# Patient Record
Sex: MALE | Race: BLACK OR AFRICAN AMERICAN | NOT HISPANIC OR LATINO | URBAN - METROPOLITAN AREA
[De-identification: names, ages, dates, MRNs, and addresses within clinical notes are randomized per-mention and may not be internally consistent; named-entity substitution may affect disease eponyms.]

---

## 2017-02-27 ENCOUNTER — INPATIENT (INPATIENT)
Facility: HOSPITAL | Age: 52
LOS: 2 days | Discharge: HOME | End: 2017-03-02
Attending: INTERNAL MEDICINE | Admitting: INTERNAL MEDICINE
Payer: COMMERCIAL

## 2017-02-27 VITALS
RESPIRATION RATE: 18 BRPM | TEMPERATURE: 103 F | SYSTOLIC BLOOD PRESSURE: 173 MMHG | WEIGHT: 250 LBS | HEIGHT: 71 IN | HEART RATE: 100 BPM | OXYGEN SATURATION: 97 % | DIASTOLIC BLOOD PRESSURE: 81 MMHG

## 2017-02-27 DIAGNOSIS — N40.0 BENIGN PROSTATIC HYPERPLASIA WITHOUT LOWER URINARY TRACT SYMPTOMS: ICD-10-CM

## 2017-02-27 DIAGNOSIS — R50.9 FEVER, UNSPECIFIED: ICD-10-CM

## 2017-02-27 DIAGNOSIS — N41.0 ACUTE PROSTATITIS: ICD-10-CM

## 2017-02-27 DIAGNOSIS — R30.0 DYSURIA: ICD-10-CM

## 2017-02-27 DIAGNOSIS — K80.20 CALCULUS OF GALLBLADDER WITHOUT CHOLECYSTITIS WITHOUT OBSTRUCTION: ICD-10-CM

## 2017-02-27 DIAGNOSIS — R59.0 LOCALIZED ENLARGED LYMPH NODES: ICD-10-CM

## 2017-02-27 DIAGNOSIS — R35.0 FREQUENCY OF MICTURITION: ICD-10-CM

## 2017-02-27 DIAGNOSIS — N17.9 ACUTE KIDNEY FAILURE, UNSPECIFIED: ICD-10-CM

## 2017-02-27 DIAGNOSIS — K57.30 DIVERTICULOSIS OF LARGE INTESTINE WITHOUT PERFORATION OR ABSCESS WITHOUT BLEEDING: ICD-10-CM

## 2017-02-27 DIAGNOSIS — K42.9 UMBILICAL HERNIA WITHOUT OBSTRUCTION OR GANGRENE: ICD-10-CM

## 2017-02-27 LAB
ALBUMIN SERPL ELPH-MCNC: 3.3 G/DL — SIGNIFICANT CHANGE UP (ref 3.3–5)
ALP SERPL-CCNC: 104 U/L — SIGNIFICANT CHANGE UP (ref 40–120)
ALT FLD-CCNC: 36 U/L — SIGNIFICANT CHANGE UP (ref 12–78)
ANION GAP SERPL CALC-SCNC: 10 MMOL/L — SIGNIFICANT CHANGE UP (ref 5–17)
ANISOCYTOSIS BLD QL: SLIGHT — SIGNIFICANT CHANGE UP
APPEARANCE UR: CLEAR — SIGNIFICANT CHANGE UP
AST SERPL-CCNC: 26 U/L — SIGNIFICANT CHANGE UP (ref 15–37)
BACTERIA # UR AUTO: ABNORMAL
BILIRUB SERPL-MCNC: 2.7 MG/DL — HIGH (ref 0.2–1.2)
BILIRUB UR-MCNC: NEGATIVE — SIGNIFICANT CHANGE UP
BUN SERPL-MCNC: 9 MG/DL — SIGNIFICANT CHANGE UP (ref 7–23)
CALCIUM SERPL-MCNC: 8.5 MG/DL — SIGNIFICANT CHANGE UP (ref 8.5–10.1)
CHLORIDE SERPL-SCNC: 104 MMOL/L — SIGNIFICANT CHANGE UP (ref 96–108)
CO2 SERPL-SCNC: 24 MMOL/L — SIGNIFICANT CHANGE UP (ref 22–31)
COLOR SPEC: YELLOW — SIGNIFICANT CHANGE UP
CREAT SERPL-MCNC: 1.54 MG/DL — HIGH (ref 0.5–1.3)
DIFF PNL FLD: ABNORMAL
EPI CELLS # UR: SIGNIFICANT CHANGE UP
FLUAV SPEC QL CULT: NEGATIVE — SIGNIFICANT CHANGE UP
FLUBV AG SPEC QL IA: NEGATIVE — SIGNIFICANT CHANGE UP
GLUCOSE SERPL-MCNC: 137 MG/DL — HIGH (ref 70–99)
GLUCOSE UR QL: NEGATIVE MG/DL — SIGNIFICANT CHANGE UP
HCT VFR BLD CALC: 37 % — LOW (ref 39–50)
HCV AB S/CO SERPL IA: 0.09 S/CO — SIGNIFICANT CHANGE UP
HCV AB SERPL-IMP: SIGNIFICANT CHANGE UP
HGB BLD-MCNC: 12.8 G/DL — LOW (ref 13–17)
HYPOCHROMIA BLD QL: SLIGHT — SIGNIFICANT CHANGE UP
KETONES UR-MCNC: NEGATIVE — SIGNIFICANT CHANGE UP
LACTATE SERPL-SCNC: 1 MMOL/L — SIGNIFICANT CHANGE UP (ref 0.7–2)
LEUKOCYTE ESTERASE UR-ACNC: ABNORMAL
LYMPHOCYTES # BLD AUTO: 13 % — SIGNIFICANT CHANGE UP (ref 13–44)
MACROCYTES BLD QL: SLIGHT — SIGNIFICANT CHANGE UP
MCHC RBC-ENTMCNC: 33.8 PG — SIGNIFICANT CHANGE UP (ref 27–34)
MCHC RBC-ENTMCNC: 34.6 GM/DL — SIGNIFICANT CHANGE UP (ref 32–36)
MCV RBC AUTO: 97.7 FL — SIGNIFICANT CHANGE UP (ref 80–100)
MICROCYTES BLD QL: SLIGHT — SIGNIFICANT CHANGE UP
MONOCYTES NFR BLD AUTO: 5 % — SIGNIFICANT CHANGE UP (ref 2–14)
NEUTROPHILS NFR BLD AUTO: 77 % — SIGNIFICANT CHANGE UP (ref 43–77)
NEUTS BAND # BLD: 5 % — SIGNIFICANT CHANGE UP (ref 0–8)
NITRITE UR-MCNC: NEGATIVE — SIGNIFICANT CHANGE UP
PH UR: 6.5 — SIGNIFICANT CHANGE UP (ref 4.8–8)
PLAT MORPH BLD: NORMAL — SIGNIFICANT CHANGE UP
PLATELET # BLD AUTO: 154 K/UL — SIGNIFICANT CHANGE UP (ref 150–400)
POTASSIUM SERPL-MCNC: 3.3 MMOL/L — LOW (ref 3.5–5.3)
POTASSIUM SERPL-SCNC: 3.3 MMOL/L — LOW (ref 3.5–5.3)
PROT SERPL-MCNC: 8.5 GM/DL — HIGH (ref 6–8.3)
PROT UR-MCNC: 100 MG/DL
RBC # BLD: 3.78 M/UL — LOW (ref 4.2–5.8)
RBC # FLD: 11.3 % — SIGNIFICANT CHANGE UP (ref 11–15)
RBC BLD AUTO: ABNORMAL
RBC CASTS # UR COMP ASSIST: SIGNIFICANT CHANGE UP /HPF (ref 0–4)
SODIUM SERPL-SCNC: 138 MMOL/L — SIGNIFICANT CHANGE UP (ref 135–145)
SP GR SPEC: 1 — LOW (ref 1.01–1.02)
UROBILINOGEN FLD QL: 12 MG/DL
WBC # BLD: 25.2 K/UL — HIGH (ref 3.8–10.5)
WBC # FLD AUTO: 25.2 K/UL — HIGH (ref 3.8–10.5)
WBC UR QL: ABNORMAL

## 2017-02-27 PROCEDURE — 71010: CPT | Mod: 26

## 2017-02-27 PROCEDURE — 74176 CT ABD & PELVIS W/O CONTRAST: CPT | Mod: 26

## 2017-02-27 PROCEDURE — 99285 EMERGENCY DEPT VISIT HI MDM: CPT | Mod: 25

## 2017-02-27 PROCEDURE — 99053 MED SERV 10PM-8AM 24 HR FAC: CPT

## 2017-02-27 PROCEDURE — 99223 1ST HOSP IP/OBS HIGH 75: CPT

## 2017-02-27 RX ORDER — CEFTRIAXONE 500 MG/1
1 INJECTION, POWDER, FOR SOLUTION INTRAMUSCULAR; INTRAVENOUS EVERY 24 HOURS
Refills: 0 | Status: DISCONTINUED | OUTPATIENT
Start: 2017-02-28 | End: 2017-03-01

## 2017-02-27 RX ORDER — AMLODIPINE BESYLATE 2.5 MG/1
10 TABLET ORAL DAILY
Refills: 0 | Status: DISCONTINUED | OUTPATIENT
Start: 2017-02-27 | End: 2017-03-02

## 2017-02-27 RX ORDER — IBUPROFEN 200 MG
600 TABLET ORAL ONCE
Refills: 0 | Status: COMPLETED | OUTPATIENT
Start: 2017-02-27 | End: 2017-02-27

## 2017-02-27 RX ORDER — SODIUM CHLORIDE 9 MG/ML
2000 INJECTION INTRAMUSCULAR; INTRAVENOUS; SUBCUTANEOUS ONCE
Refills: 0 | Status: COMPLETED | OUTPATIENT
Start: 2017-02-27 | End: 2017-02-27

## 2017-02-27 RX ORDER — ACETAMINOPHEN 500 MG
650 TABLET ORAL EVERY 6 HOURS
Refills: 0 | Status: DISCONTINUED | OUTPATIENT
Start: 2017-02-27 | End: 2017-03-02

## 2017-02-27 RX ORDER — ACETAMINOPHEN 500 MG
650 TABLET ORAL ONCE
Refills: 0 | Status: COMPLETED | OUTPATIENT
Start: 2017-02-27 | End: 2017-02-27

## 2017-02-27 RX ORDER — TAMSULOSIN HYDROCHLORIDE 0.4 MG/1
0.4 CAPSULE ORAL AT BEDTIME
Refills: 0 | Status: DISCONTINUED | OUTPATIENT
Start: 2017-02-27 | End: 2017-03-02

## 2017-02-27 RX ORDER — SODIUM CHLORIDE 9 MG/ML
3 INJECTION INTRAMUSCULAR; INTRAVENOUS; SUBCUTANEOUS ONCE
Refills: 0 | Status: COMPLETED | OUTPATIENT
Start: 2017-02-27 | End: 2017-02-27

## 2017-02-27 RX ORDER — PIPERACILLIN AND TAZOBACTAM 4; .5 G/20ML; G/20ML
3.38 INJECTION, POWDER, LYOPHILIZED, FOR SOLUTION INTRAVENOUS ONCE
Refills: 0 | Status: COMPLETED | OUTPATIENT
Start: 2017-02-27 | End: 2017-02-27

## 2017-02-27 RX ORDER — VANCOMYCIN HCL 1 G
1000 VIAL (EA) INTRAVENOUS ONCE
Refills: 0 | Status: COMPLETED | OUTPATIENT
Start: 2017-02-27 | End: 2017-02-27

## 2017-02-27 RX ORDER — CEFTRIAXONE 500 MG/1
INJECTION, POWDER, FOR SOLUTION INTRAMUSCULAR; INTRAVENOUS
Refills: 0 | Status: DISCONTINUED | OUTPATIENT
Start: 2017-02-27 | End: 2017-03-01

## 2017-02-27 RX ORDER — ENOXAPARIN SODIUM 100 MG/ML
40 INJECTION SUBCUTANEOUS DAILY
Refills: 0 | Status: DISCONTINUED | OUTPATIENT
Start: 2017-02-27 | End: 2017-02-28

## 2017-02-27 RX ORDER — CEFTRIAXONE 500 MG/1
1 INJECTION, POWDER, FOR SOLUTION INTRAMUSCULAR; INTRAVENOUS ONCE
Refills: 0 | Status: COMPLETED | OUTPATIENT
Start: 2017-02-27 | End: 2017-02-27

## 2017-02-27 RX ORDER — SODIUM CHLORIDE 9 MG/ML
1000 INJECTION INTRAMUSCULAR; INTRAVENOUS; SUBCUTANEOUS
Refills: 0 | Status: DISCONTINUED | OUTPATIENT
Start: 2017-02-27 | End: 2017-02-27

## 2017-02-27 RX ORDER — POTASSIUM CHLORIDE 20 MEQ
40 PACKET (EA) ORAL ONCE
Refills: 0 | Status: COMPLETED | OUTPATIENT
Start: 2017-02-27 | End: 2017-02-27

## 2017-02-27 RX ORDER — SODIUM CHLORIDE 9 MG/ML
1000 INJECTION INTRAMUSCULAR; INTRAVENOUS; SUBCUTANEOUS
Refills: 0 | Status: DISCONTINUED | OUTPATIENT
Start: 2017-02-27 | End: 2017-03-01

## 2017-02-27 RX ADMIN — Medication 650 MILLIGRAM(S): at 09:12

## 2017-02-27 RX ADMIN — SODIUM CHLORIDE 50 MILLILITER(S): 9 INJECTION INTRAMUSCULAR; INTRAVENOUS; SUBCUTANEOUS at 16:42

## 2017-02-27 RX ADMIN — CEFTRIAXONE 100 GRAM(S): 500 INJECTION, POWDER, FOR SOLUTION INTRAMUSCULAR; INTRAVENOUS at 10:03

## 2017-02-27 RX ADMIN — PIPERACILLIN AND TAZOBACTAM 200 GRAM(S): 4; .5 INJECTION, POWDER, LYOPHILIZED, FOR SOLUTION INTRAVENOUS at 08:49

## 2017-02-27 RX ADMIN — SODIUM CHLORIDE 3 MILLILITER(S): 9 INJECTION INTRAMUSCULAR; INTRAVENOUS; SUBCUTANEOUS at 05:59

## 2017-02-27 RX ADMIN — Medication 600 MILLIGRAM(S): at 06:34

## 2017-02-27 RX ADMIN — ENOXAPARIN SODIUM 40 MILLIGRAM(S): 100 INJECTION SUBCUTANEOUS at 16:44

## 2017-02-27 RX ADMIN — Medication 650 MILLIGRAM(S): at 16:42

## 2017-02-27 RX ADMIN — Medication 650 MILLIGRAM(S): at 22:27

## 2017-02-27 RX ADMIN — TAMSULOSIN HYDROCHLORIDE 0.4 MILLIGRAM(S): 0.4 CAPSULE ORAL at 22:27

## 2017-02-27 RX ADMIN — SODIUM CHLORIDE 1000 MILLILITER(S): 9 INJECTION INTRAMUSCULAR; INTRAVENOUS; SUBCUTANEOUS at 06:07

## 2017-02-27 RX ADMIN — Medication 40 MILLIEQUIVALENT(S): at 16:43

## 2017-02-27 RX ADMIN — Medication 600 MILLIGRAM(S): at 07:37

## 2017-02-27 RX ADMIN — SODIUM CHLORIDE 125 MILLILITER(S): 9 INJECTION INTRAMUSCULAR; INTRAVENOUS; SUBCUTANEOUS at 07:40

## 2017-02-27 RX ADMIN — AMLODIPINE BESYLATE 10 MILLIGRAM(S): 2.5 TABLET ORAL at 16:43

## 2017-02-27 NOTE — ED PROVIDER NOTE - OBJECTIVE STATEMENT
51 year old male with h/o HTN presents today c/o fever and difficulty urinating since Friday, pt c/o urinating in dribbles especially during the night, +cough + back pains +weakness (-) chest pain (-) sob (-) palpitations (-) sore throat (-) ear pain (-) nausea or vomiting +dry mouth, normal appetite, pt has been taking tylenol which he last took last night

## 2017-02-27 NOTE — CONSULT NOTE ADULT - ASSESSMENT
51 yrs old male has dysuria abd fever with no hydro on CT scan, most likely acute prostatitis. suggest : continue Rocephin 51 yrs old male has dysuria abd fever with no hydro on CT scan, most likely acute prostatitis. suggest : continue Rocephin  PVR; 58 mls will add Flomax

## 2017-02-27 NOTE — CONSULT NOTE ADULT - SUBJECTIVE AND OBJECTIVE BOX
Surgeon:  Cedric Arce MD,DO.    [phone]    Consult requesting by:  Leonardo Bowers MD    HISTORY OF PRESENT ILLNESS:  51y Male patient with PMHx of HTN, came to Emergency room with complaints of difficult urinating, dribbles, and high fever since last Friday, also complaints of  weakness, mild cough.      PAST MEDICAL & SURGICAL HISTORY:  Hypertension.  Genital Herpes    No significant past surgical history      MEDICATIONS  (STANDING):  cefTRIAXone   IVPB  IV Intermittent   enoxaparin Injectable 40milliGRAM(s) SubCutaneous daily  amLODIPine   Tablet 10milliGRAM(s) Oral daily  potassium chloride    Tablet ER 40milliEquivalent(s) Oral once  sodium chloride 0.9%. 1000milliLiter(s) IV Continuous <Continuous>    MEDICATIONS  (PRN):  acetaminophen   Tablet 650milliGRAM(s) Oral every 6 hours PRN For Temp greater than 38 C (100.4 F)  Valtrex PRN  Testosterone PRN, prescribed by PMD    Allergies:  No Known Allergies    SOCIAL HISTORY:  Smoker: [ ] Yes  [x ] No        PACK YEARS:                           ETOH use: [ ] Yes  [x ] No              FREQUENCY / QUANTITY:  elicit Drug use:  [ ] Yes  [x ] No   Recovered from Drug abuse. Stopped > 10 years ago  Occupation: Building service.    FAMILY HISTORY:  No pertinent family history in first degree relatives    REVIEW OF SYSTEMS:  CONSTITUTIONAL: + fever,  no weight loss, + fatigue  EYES: No eye pain, visual disturbances, or discharge  ENT:  No difficulty hearing, tinnitus, vertigo; No sinus or throat pain  NECK: No pain or stiffness  BREASTS: No pain, masses, or nipple discharge  RESPIRATORY: Mild  cough, no wheezing, + chills, no  hemoptysis; No shortness of breath  CARDIOVASCULAR: No chest pain, palpitations, dizziness, or leg swelling  GASTROINTESTINAL: No abdominal or epigastric pain. No nausea, vomiting, or hematemesis; No diarrhea or constipation. No melena or hematochezia.  GENITOURINARY: +  dysuria, + frequency, no noticed hematuria, no  incontinence,  NEUROLOGICAL: No headaches, memory loss, loss of strength, numbness, or tremors  SKIN: No itching, burning, rashes, or lesions   LYMPH NODES: No enlarged glands  ENDOCRINE: No heat or cold intolerance; No hair loss  MUSCULOSKELETAL: No joint pain or swelling; No muscle, back, or extremity pain  PSYCHIATRIC: No depression, anxiety, mood swings, or difficulty sleeping  HEME/LYMPH: No easy bruising, or bleeding gums  ALLERGY AND IMMUNOLOGIC: No hives or eczema    PHYSICAL EXAM  Vital Signs Last 24 Hrs  T(C): 39.2, Max: 39.6 ( @ 04:43)  T(F): 102.6, Max: 103.2 ( @ 04:43)  HR: 91 (65 - 100)  BP: 169/95 (110/46 - 173/81)  BP(mean): --  RR: 18 (16 - 18)  SpO2: 98% (96% - 99%)  I&O's Summary    General: Patient alert. Oriented, on NAD, well developed.  HEENT: Normo cephalus, Conjunctiva  anicteric, No JVD, Neck supple.  LUNGS: CTA B/L,  no  Rhonchi, no rales ,no wheezing  HEART: S1 S2 RRR, normal PMI  ABDOMEN: Non distended, + normal BS, + Soft, Non tenderness, small Umbilical hernia, no incarceration.  RECTAL:  Patient refused.  EXTREMITIES: No edema, no calf tenderness.  NEURO: AxAxOx 3     LABS:                        12.8   25.2  )-----------( 154      ( 2017 06:15 )             37.0     2017 06:15    138    |  104    |  9      ----------------------------<  137    3.3     |  24     |  1.54     Ca    8.5        2017 06:15    TPro  8.5    /  Alb  3.3    /  T.Bili 2.7   /  DBili  x      /  AST  26     /  ALT  36     /  AlkPhos  104    2017 06:15      Urinalysis Basic - ( 2017 06:17 )    Color: Yellow / Appearance: Clear / S.005 / pH: x  Gluc : Negative  / Bili: Negative / Urobili: 12 mg/dL   Blood: +/ Protein: 100 mg/dL / Nitrite: Negative   Leuk Esterase: Small / RBC: 0-2 /HPF / WBC 6-10   Sq Epi: x / Non Sq Epi: Few / Bacteria: Moderate      LIVER FUNCTIONS - ( 2017 06:15 )  Alb: 3.3 g/dL / Pro: 8.5 gm/dL / ALK PHOS: 104 U/L / ALT: 36 U/L / AST: 26 U/L / GGT: x             RADIOLOGY:  XAM:  CT ABDOMEN AND PELVIS                          PROCEDURE DATE:  2017      INTERPRETATION:  CLINICAL INFORMATION: Difficulty urinating    COMPARISON: No prior examinations are available for comparison.    PROCEDURE:    Serial axial sections through the abdomen and pelvis were obtained with   the patient in supine position without the use of intravenous contrast.    Coronal and sagittal 3-D reformats were created from the axial images.    FINDINGS:    Evaluation of solid organs and vascular structures is limited by lack of   intravenous contrast, which is standard for urinary calculus assessment   CT.     LOWER CHEST: Clear lung bases.    ABDOMEN:  RIGHT KIDNEY AND URETER: No calculi. No hydronephrosis.  LEFT KIDNEY AND URETER: No calculi. No hydronephrosis.  LIVER: Within normal limits.  BILE DUCTS: Normal caliber.  GALLBLADDER: Question tiny calcified gallstones. Normal caliber wall.  PANCREAS: Within normal limits.  SPLEEN: Within normal limits.  ADRENALS: Within normal limits.    PELVIS:  REPRODUCTIVE ORGANS: Enlarged prostate gland measuring 6.1 x 5 x 5.4 cm.   Periprostatic inflammatory changes.  URINARY BLADDER: Mild urinary bladder wall thickening.    BOWEL: Several colonic diverticula. No bowel obstruction.  APPENDIX: Within normal limits.  PERITONEUM: No ascites or free air. No fluid collection.  VESSELS: Within normal limits.  RETROPERITONEUM: Mild retroperitoneal and pelvic adenopathy measuring up   to 17 mm short axis dimension.  ABDOMINAL WALL: Smallfat-containing umbilical hernia.  BONES: Hip and spine degenerative changes.    IMPRESSION:    No urolithiasis or hydronephrosis.  Mild retroperitoneal and pelvic adenopathy.  Enlarged prostate gland with periprostatic inflammatory changes,   suspicious for prostatitis.      DERIAN BERRIOS M.D., ATTENDING RADIOLOGIST  This document has been electronically signed. 2017  8:39AM                HEALTH ISSUES - PROBLEM Dx:

## 2017-02-27 NOTE — H&P ADULT. - HISTORY OF PRESENT ILLNESS
ED provider stated: "51 year old male with h/o HTN presents today c/o fever and difficulty urinating since Friday, pt c/o urinating in dribbles especially during the night, +cough + back pains +weakness (-) chest pain (-) sob (-) palpitations (-) sore throat (-) ear pain (-) nausea or vomiting +dry mouth, normal appetite, pt has been taking tylenol which he last took last night"

## 2017-02-27 NOTE — ED PROVIDER NOTE - CARE PLAN
Principal Discharge DX:	Febrile illness Principal Discharge DX:	Prostatitis, acute  Secondary Diagnosis:	Febrile illness

## 2017-02-27 NOTE — CONSULT NOTE ADULT - SUBJECTIVE AND OBJECTIVE BOX
HPI:  ED provider stated: "51 year old male with h/o HTN presents today c/o fever and difficulty urinating since Friday, pt c/o urinating in dribbles especially during the night, +cough + back pains +weakness (-) chest pain (-) sob (-) palpitations (-) sore throat (-) ear pain (-) nausea or vomiting +dry mouth, normal appetite, pt has been taking tylenol which he last took last night" (2017 11:02)      PAST MEDICAL & SURGICAL HISTORY:  Hypertension  No significant past surgical history      Allergies    No Known Allergies    SOCIAL HISTORY;     FAMILY HISTORY:  No pertinent family history in first degree relatives      MEDICATIONS  (STANDING):  cefTRIAXone   IVPB  IV Intermittent   enoxaparin Injectable 40milliGRAM(s) SubCutaneous daily  amLODIPine   Tablet 10milliGRAM(s) Oral daily  sodium chloride 0.9%. 1000milliLiter(s) IV Continuous <Continuous>    MEDICATIONS  (PRN):  acetaminophen   Tablet 650milliGRAM(s) Oral every 6 hours PRN For Temp greater than 38 C (100.4 F)      ROS:    General:  No wt loss, +fevers, +chills, night sweats  Eyes:  Good vision, no reported pain  ENT:  No sore throat, pain, runny nose, dysphagia  CV:  No pain, palpitatioins, hypo/hypertension  Resp:  No dyspnea, cough, tachypnea, wheezing  GI:  No pain, nausea, vomiting, diarrhea, constipatiion  :  dysuria, nocturia, frequency  Muscle:  No pain, weakness  Neuro:  No weakness, tingling, memory problems  Psych:  No fatigue, insomnia, mood problems, depression  Endocrine:  No polyuria, polydypsia, cold/heat intolerance  Heme:  No petechiae, ecchymosis, easy bruisability  Skin:  No rash, tattoos, scars, edema      Physical Exam:    Vital Signs:  Vital Signs Last 24 Hrs  T(C): 39.2, Max: 39.6 ( @ 04:43)  T(F): 102.6, Max: 103.2 ( @ 04:43)  HR: 91 (65 - 100)  BP: 169/95 (110/46 - 173/81)  BP(mean): --  RR: 18 (16 - 18)  SpO2: 98% (96% - 99%)  Daily Height in cm: 180.34 (2017 04:43)    Daily   I&O's Summary      General:  Appears stated age,  well-nourished, no distress  HEENT:  NC/AT, patent nares w/ pink mucosa, OP moist and pink,  PERRL, EOMI, conjunctivae clear, no thyromegaly, nodules, adenopathy, no JVD  Chest:  Full & symmetric excursion, no increased effort.   Cardiovascular:  Regular rhythm, S1, S2,   Abdomen:  Soft, non-tender, non-distended, normoactive bowel sounds.  Genitalia: Circumcised Phallus, Adequate meatus, no hypospadias. Both testicles descended, non tender, no mass. No epididymitis or epididymal mass. No hydrocele.  Rectal Examination: Deferred.  Extremities:  no edema, pedal pusation are present, no calf tenderness.  Skin:  No rash/erythema/ecchymoses/petechiae/wounds/abscess/warm/dry  Musculoskeletal:  Full ROM in all joints w/o swelling/tenderness/effusion  Neuro/Psych:  Alert, oriented, normal and grossly intact and symmetrical.      LABS:                        12.8   25.2  )-----------( 154      ( 2017 06:15 )             37.0     2017 06:15    138    |  104    |  9      ----------------------------<  137    3.3     |  24     |  1.54     Ca    8.5        2017 06:15    TPro  8.5    /  Alb  3.3    /  TBili  2.7    /  DBili  x      /  AST  26     /  ALT  36     /  AlkPhos  104    2017 06:15      Urinalysis Basic - ( 2017 06:17 )    Color: Yellow / Appearance: Clear / S.005 / pH: x  Gluc: x / Ketone: Negative  / Bili: Negative / Urobili: 12 mg/dL   Blood: x / Protein: 100 mg/dL / Nitrite: Negative   Leuk Esterase: Small / RBC: 0-2 /HPF / WBC 6-10   Sq Epi: x / Non Sq Epi: Few / Bacteria: Moderate        RADIOLOGY & ADDITIONAL STUDIES:    EXAM:  CT ABDOMEN AND PELVIS                            PROCEDURE DATE:  2017        INTERPRETATION:  CLINICAL INFORMATION: Difficulty urinating    COMPARISON: No prior examinations are available for comparison.    PROCEDURE:    Serial axial sections through the abdomen and pelvis were obtained with   the patient in supine position without the use of intravenous contrast.    Coronal and sagittal 3-D reformats were created from the axial images.    FINDINGS:    Evaluation of solid organs and vascular structures is limited by lack of   intravenous contrast, which is standard for urinary calculus assessment   CT.     LOWER CHEST: Clear lung bases.    ABDOMEN:  RIGHT KIDNEY AND URETER: No calculi. No hydronephrosis.  LEFT KIDNEY AND URETER: No calculi. No hydronephrosis.  LIVER: Within normal limits.  BILE DUCTS: Normal caliber.  GALLBLADDER: Question tiny calcified gallstones. Normal caliber wall.  PANCREAS: Within normal limits.  SPLEEN: Within normal limits.  ADRENALS: Within normal limits.    PELVIS:  REPRODUCTIVE ORGANS: Enlarged prostate gland measuring 6.1 x 5 x 5.4 cm.   Periprostatic inflammatory changes.  URINARY BLADDER: Mild urinary bladder wall thickening.    BOWEL: Several colonic diverticula. No bowel obstruction.  APPENDIX: Within normal limits.  PERITONEUM: No ascites or free air. No fluid collection. HPI:  ED provider stated: "51 year old male with h/o HTN presents today c/o fever and difficulty urinating since Friday, pt c/o urinating in dribbles especially during the night, +cough + back pains +weakness (-) chest pain (-) sob (-) palpitations (-) sore throat (-) ear pain (-) nausea or vomiting +dry mouth, normal appetite, pt has been taking tylenol which he last took last night" (2017 11:02)      PAST MEDICAL & SURGICAL HISTORY:  Hypertension  No significant past surgical history      Allergies    No Known Allergies    SOCIAL HISTORY;     FAMILY HISTORY:  No pertinent family history in first degree relatives      MEDICATIONS  (STANDING):  cefTRIAXone   IVPB  IV Intermittent   enoxaparin Injectable 40milliGRAM(s) SubCutaneous daily  amLODIPine   Tablet 10milliGRAM(s) Oral daily  sodium chloride 0.9%. 1000milliLiter(s) IV Continuous <Continuous>    MEDICATIONS  (PRN):  acetaminophen   Tablet 650milliGRAM(s) Oral every 6 hours PRN For Temp greater than 38 C (100.4 F)      ROS:    General:  No wt loss, +fevers, +chills, night sweats  Eyes:  Good vision, no reported pain  ENT:  No sore throat, pain, runny nose, dysphagia  CV:  No pain, palpitatioins, hypo/hypertension  Resp:  No dyspnea, cough, tachypnea, wheezing  GI:  No pain, nausea, vomiting, diarrhea, constipatiion  :  dysuria, nocturia, frequency  Muscle:  No pain, weakness  Neuro:  No weakness, tingling, memory problems  Psych:  No fatigue, insomnia, mood problems, depression  Endocrine:  No polyuria, polydypsia, cold/heat intolerance  Heme:  No petechiae, ecchymosis, easy bruisability  Skin:  No rash, tattoos, scars, edema      Physical Exam:    Vital Signs:  Vital Signs Last 24 Hrs  T(C): 39.2, Max: 39.6 ( @ 04:43)  T(F): 102.6, Max: 103.2 ( @ 04:43)  HR: 91 (65 - 100)  BP: 169/95 (110/46 - 173/81)  BP(mean): --  RR: 18 (16 - 18)  SpO2: 98% (96% - 99%)  Daily Height in cm: 180.34 (2017 04:43)    Daily   I&O's Summary      General:  Appears stated age,  well-nourished, no distress  HEENT:  NC/AT, patent nares w/ pink mucosa, OP moist and pink,  PERRL, EOMI, conjunctivae clear, no thyromegaly, nodules, adenopathy, no JVD  Chest:  Full & symmetric excursion, no increased effort.   Cardiovascular:  Regular rhythm, S1, S2,   Abdomen:  Soft, non-tender, non-distended, normoactive bowel sounds.  Genitalia: Circumcised Phallus, Adequate meatus, no hypospadias. Both testicles descended, non tender, no mass. No epididymitis or epididymal mass. No hydrocele.  Rectal Examination: Deferred.  Extremities:  no edema, pedal pusation are present, no calf tenderness.  Skin:  No rash/erythema/ecchymoses/petechiae/wounds/abscess/warm/dry  Musculoskeletal:  Full ROM in all joints w/o swelling/tenderness/effusion  Neuro/Psych:  Alert, oriented, normal and grossly intact and symmetrical.      LABS:                        12.8   25.2  )-----------( 154      ( 2017 06:15 )             37.0     2017 06:15    138    |  104    |  9      ----------------------------<  137    3.3     |  24     |  1.54     Ca    8.5        2017 06:15    TPro  8.5    /  Alb  3.3    /  TBili  2.7    /  DBili  x      /  AST  26     /  ALT  36     /  AlkPhos  104    2017 06:15      Urinalysis Basic - ( 2017 06:17 )    Color: Yellow / Appearance: Clear / S.005 / pH: x  Gluc: x / Ketone: Negative  / Bili: Negative / Urobili: 12 mg/dL   Blood: x / Protein: 100 mg/dL / Nitrite: Negative   Leuk Esterase: Small / RBC: 0-2 /HPF / WBC 6-10   Sq Epi: x / Non Sq Epi: Few / Bacteria: Moderate        RADIOLOGY & ADDITIONAL STUDIES:    EXAM:  CT ABDOMEN AND PELVIS                            PROCEDURE DATE:  2017        INTERPRETATION:  CLINICAL INFORMATION: Difficulty urinating    COMPARISON: No prior examinations are available for comparison.    PROCEDURE:    Serial axial sections through the abdomen and pelvis were obtained with   the patient in supine position without the use of intravenous contrast.    Coronal and sagittal 3-D reformats were created from the axial images.    FINDINGS:    Evaluation of solid organs and vascular structures is limited by lack of   intravenous contrast, which is standard for urinary calculus assessment   CT.     LOWER CHEST: Clear lung bases.    ABDOMEN:  RIGHT KIDNEY AND URETER: No calculi. No hydronephrosis.  LEFT KIDNEY AND URETER: No calculi. No hydronephrosis.  LIVER: Within normal limits.  BILE DUCTS: Normal caliber.  GALLBLADDER: Question tiny calcified gallstones. Normal caliber wall.  PANCREAS: Within normal limits.  SPLEEN: Within normal limits.  ADRENALS: Within normal limits.    PELVIS:  REPRODUCTIVE ORGANS: Enlarged prostate gland measuring 6.1 x 5 x 5.4 cm.   Periprostatic inflammatory changes.  URINARY BLADDER: Mild urinary bladder wall thickening.    BOWEL: Several colonic diverticula. No bowel obstruction.  APPENDIX: Within normal limits.  PERITONEUM: No ascites or free air. No fluid collection.  VESSELS: Within normal limits.  RETROPERITONEUM: Mild retroperitoneal and pelvic adenopathy measuring up   to 17 mm short axis dimension.  ABDOMINAL WALL: Smallfat-containing umbilical hernia.  BONES: Hip and spine degenerative changes.    IMPRESSION:    No urolithiasis or hydronephrosis.  Mild retroperitoneal and pelvic adenopathy.  Enlarged prostate gland with periprostatic inflammatory changes,   suspicious for prostatitis. HPI:  ED provider stated: "51 year old male with h/o HTN presents today c/o fever and difficulty urinating since Friday, pt c/o urinating in dribbles especially during the night, +cough + back pains +weakness (-) chest pain (-) sob (-) palpitations (-) sore throat (-) ear pain (-) nausea or vomiting +dry mouth, normal appetite, pt has been taking tylenol which he last took last night" (2017 11:02)      PAST MEDICAL & SURGICAL HISTORY:  Hypertension  No significant past surgical history      Allergies: Sulfa        SOCIAL HISTORY;      FAMILY HISTORY:  No pertinent family history in first degree relatives      MEDICATIONS  (STANDING):  cefTRIAXone   IVPB  IV Intermittent   enoxaparin Injectable 40milliGRAM(s) SubCutaneous daily  amLODIPine   Tablet 10milliGRAM(s) Oral daily  sodium chloride 0.9%. 1000milliLiter(s) IV Continuous <Continuous>    MEDICATIONS  (PRN):  acetaminophen   Tablet 650milliGRAM(s) Oral every 6 hours PRN For Temp greater than 38 C (100.4 F)      ROS:    General:  No wt loss, +fevers, +chills, night sweats  Eyes:  Good vision, no reported pain  ENT:  No sore throat, pain, runny nose, dysphagia  CV:  No pain, palpitatioins, hypo/hypertension  Resp:  No dyspnea, cough, tachypnea, wheezing  GI:  No pain, nausea, vomiting, diarrhea, constipatiion  :  +dysuria, +nocturia, + frequency ( Q1h)  Muscle:  No pain, weakness  Neuro:  No weakness, tingling, memory problems  Psych:  No fatigue, insomnia, mood problems, depression  Endocrine:  No polyuria, polydypsia, cold/heat intolerance  Heme:  No petechiae, ecchymosis, easy bruisability  Skin:  No rash, tattoos, scars, edema      Physical Exam:    Vital Signs:  Vital Signs Last 24 Hrs  T(C): 39.2, Max: 39.6 ( @ 04:43)  T(F): 102.6, Max: 103.2 ( @ 04:43)  HR: 91 (65 - 100)  BP: 169/95 (110/46 - 173/81)  BP(mean): --  RR: 18 (16 - 18)  SpO2: 98% (96% - 99%)  Daily Height in cm: 180.34 (2017 04:43)    Daily   I&O's Summary      General:  Appears stated age,  well-nourished, no distress  HEENT:  NC/AT, patent nares w/ pink mucosa, OP moist and pink,  PERRL, EOMI, conjunctivae clear, no thyromegaly, nodules, adenopathy, no JVD  Chest:  Full & symmetric excursion, no increased effort.   Cardiovascular:  Regular rhythm, S1, S2,   Abdomen:  Soft, non-tender, non-distended, normoactive bowel sounds.  Genitalia: Circumcised Phallus, Adequate meatus, no hypospadias. Both testicles descended, non tender, no mass. No epididymitis or epididymal mass. No hydrocele.  Rectal Examination: Deferred.  Extremities:  no edema, pedal pusation are present, no calf tenderness.  Skin:  No rash/erythema/ecchymoses/petechiae/wounds/abscess/warm/dry  Musculoskeletal:  Full ROM in all joints w/o swelling/tenderness/effusion  Neuro/Psych:  Alert, oriented, normal and grossly intact and symmetrical.      LABS:                        12.8   25.2  )-----------( 154      ( 2017 06:15 )             37.0     2017 06:15    138    |  104    |  9      ----------------------------<  137    3.3     |  24     |  1.54     Ca    8.5        2017 06:15    TPro  8.5    /  Alb  3.3    /  TBili  2.7    /  DBili  x      /  AST  26     /  ALT  36     /  AlkPhos  104    2017 06:15      Urinalysis Basic - ( 2017 06:17 )    Color: Yellow / Appearance: Clear / S.005 / pH: x  Gluc: x / Ketone: Negative  / Bili: Negative / Urobili: 12 mg/dL   Blood: x / Protein: 100 mg/dL / Nitrite: Negative   Leuk Esterase: Small / RBC: 0-2 /HPF / WBC 6-10   Sq Epi: x / Non Sq Epi: Few / Bacteria: Moderate        RADIOLOGY & ADDITIONAL STUDIES:    EXAM:  CT ABDOMEN AND PELVIS                            PROCEDURE DATE:  2017        INTERPRETATION:  CLINICAL INFORMATION: Difficulty urinating    COMPARISON: No prior examinations are available for comparison.    PROCEDURE:    Serial axial sections through the abdomen and pelvis were obtained with   the patient in supine position without the use of intravenous contrast.    Coronal and sagittal 3-D reformats were created from the axial images.    FINDINGS:    Evaluation of solid organs and vascular structures is limited by lack of   intravenous contrast, which is standard for urinary calculus assessment   CT.     LOWER CHEST: Clear lung bases.    ABDOMEN:  RIGHT KIDNEY AND URETER: No calculi. No hydronephrosis.  LEFT KIDNEY AND URETER: No calculi. No hydronephrosis.  LIVER: Within normal limits.  BILE DUCTS: Normal caliber.  GALLBLADDER: Question tiny calcified gallstones. Normal caliber wall.  PANCREAS: Within normal limits.  SPLEEN: Within normal limits.  ADRENALS: Within normal limits.    PELVIS:  REPRODUCTIVE ORGANS: Enlarged prostate gland measuring 6.1 x 5 x 5.4 cm.   Periprostatic inflammatory changes.  URINARY BLADDER: Mild urinary bladder wall thickening.    BOWEL: Several colonic diverticula. No bowel obstruction.  APPENDIX: Within normal limits.  PERITONEUM: No ascites or free air. No fluid collection.  VESSELS: Within normal limits.  RETROPERITONEUM: Mild retroperitoneal and pelvic adenopathy measuring up   to 17 mm short axis dimension.  ABDOMINAL WALL: Smallfat-containing umbilical hernia.  BONES: Hip and spine degenerative changes.    IMPRESSION:    No urolithiasis or hydronephrosis.  Mild retroperitoneal and pelvic adenopathy.  Enlarged prostate gland with periprostatic inflammatory changes,   suspicious for prostatitis.

## 2017-02-27 NOTE — ED PROVIDER NOTE - PROGRESS NOTE DETAILS
Discussed case with Dr. Andres urology request I admit and switch abx to ceftriaxone. Discussed with Dr. Bowers who will admit.

## 2017-02-27 NOTE — ED ADULT NURSE NOTE - OBJECTIVE STATEMENT
Pt states he has had trouble urinating since Friday.  Denies pain during urination, c/o back pain.  Took Tylenol and Advil to relieve fever.

## 2017-02-27 NOTE — H&P ADULT. - ASSESSMENT
51 year old male with h/o HTN presents today c/o fever and LUTS, has enlarged prostate 150 g per Dr. Osorio, prostatitis.

## 2017-02-27 NOTE — CONSULT NOTE ADULT - ASSESSMENT
50 y/o male patient with PMHx of HTN, complaints of Dysuria, fever.Image study showed enlarged Prostate with inflammatory changes,  Possible Prostatitis, BPH.  Gallbladder calcified tiny stones, Elevated Total Bili.  Cholelithiasis. No sign of Acute biliary disease at this moment.   HTN.    Plan:    Recommend  Urology Consultation,  IV antibiotics, IVF, pain and fever control with Tylenol and Cold compresses, may need cool blanket   Blood culture, Urine culture & sensitivity, monitor LFT's, and T. Bili. ,WBC,   Abdominal US, RUQ.  Further management according clinical condition and labs results. 50 y/o male patient with PMHx of HTN, complaints of Dysuria, fever.Image study showed enlarged Prostate with inflammatory changes,  Possible Prostatitis, BPH.  Gallbladder calcified tiny stones, Elevated Total Bili.  Cholelithiasis. No sign of Acute biliary disease at this moment.   HTN.  Hypokalemia.    Plan:    Recommend  Urology Consultation,  IV antibiotics, IVF, pain and fever control with Tylenol and Cold compresses, may need cool blanket   Blood culture, Urine culture & sensitivity, monitor LFT's, and T. Bili. ,WBC,   Abdominal US, RUQ. Replace Potassium IV. HTN meds.  Further management according clinical condition and labs results.

## 2017-02-27 NOTE — ED ADULT NURSE REASSESSMENT NOTE - NS ED NURSE REASSESS COMMENT FT1
to be admitted awaiting a bed assignment  sleeping easily aroused, vitals stable.
received patinet @[id number] alert and orient iv fluid infusing into right a/c #18 no swelling patient says he feels better flu swab sent. placed on droplet precautions. tolerated ct.

## 2017-02-28 DIAGNOSIS — N41.0 ACUTE PROSTATITIS: ICD-10-CM

## 2017-02-28 DIAGNOSIS — A41.51 SEPSIS DUE TO ESCHERICHIA COLI [E. COLI]: ICD-10-CM

## 2017-02-28 LAB
ANION GAP SERPL CALC-SCNC: 8 MMOL/L — SIGNIFICANT CHANGE UP (ref 5–17)
BUN SERPL-MCNC: 9 MG/DL — SIGNIFICANT CHANGE UP (ref 7–23)
CALCIUM SERPL-MCNC: 8.5 MG/DL — SIGNIFICANT CHANGE UP (ref 8.5–10.1)
CHLORIDE SERPL-SCNC: 109 MMOL/L — HIGH (ref 96–108)
CHOLEST SERPL-MCNC: 148 MG/DL — SIGNIFICANT CHANGE UP (ref 10–199)
CO2 SERPL-SCNC: 24 MMOL/L — SIGNIFICANT CHANGE UP (ref 22–31)
CREAT SERPL-MCNC: 1.24 MG/DL — SIGNIFICANT CHANGE UP (ref 0.5–1.3)
GLUCOSE SERPL-MCNC: 104 MG/DL — HIGH (ref 70–99)
HBA1C BLD-MCNC: 4.9 % — SIGNIFICANT CHANGE UP (ref 4–5.6)
HCT VFR BLD CALC: 32.2 % — LOW (ref 39–50)
HDLC SERPL-MCNC: 25 MG/DL — LOW (ref 40–125)
HGB BLD-MCNC: 11.2 G/DL — LOW (ref 13–17)
LIPID PNL WITH DIRECT LDL SERPL: 96 MG/DL — SIGNIFICANT CHANGE UP
MCHC RBC-ENTMCNC: 34.4 PG — HIGH (ref 27–34)
MCHC RBC-ENTMCNC: 34.8 GM/DL — SIGNIFICANT CHANGE UP (ref 32–36)
MCV RBC AUTO: 99 FL — SIGNIFICANT CHANGE UP (ref 80–100)
PLATELET # BLD AUTO: 137 K/UL — LOW (ref 150–400)
POTASSIUM SERPL-MCNC: 3.5 MMOL/L — SIGNIFICANT CHANGE UP (ref 3.5–5.3)
POTASSIUM SERPL-SCNC: 3.5 MMOL/L — SIGNIFICANT CHANGE UP (ref 3.5–5.3)
RBC # BLD: 3.25 M/UL — LOW (ref 4.2–5.8)
RBC # FLD: 12.2 % — SIGNIFICANT CHANGE UP (ref 11–15)
SODIUM SERPL-SCNC: 141 MMOL/L — SIGNIFICANT CHANGE UP (ref 135–145)
TOTAL CHOLESTEROL/HDL RATIO MEASUREMENT: 5.9 RATIO — SIGNIFICANT CHANGE UP (ref 3.4–9.6)
TRIGL SERPL-MCNC: 133 MG/DL — SIGNIFICANT CHANGE UP (ref 10–149)
TSH SERPL-MCNC: 1.49 UU/ML — SIGNIFICANT CHANGE UP (ref 0.36–3.74)
WBC # BLD: 23 K/UL — HIGH (ref 3.8–10.5)
WBC # FLD AUTO: 23 K/UL — HIGH (ref 3.8–10.5)

## 2017-02-28 PROCEDURE — 99233 SBSQ HOSP IP/OBS HIGH 50: CPT

## 2017-02-28 PROCEDURE — 99253 IP/OBS CNSLTJ NEW/EST LOW 45: CPT

## 2017-02-28 PROCEDURE — 76700 US EXAM ABDOM COMPLETE: CPT | Mod: 26

## 2017-02-28 RX ORDER — GENTAMICIN SULFATE 40 MG/ML
500 VIAL (ML) INJECTION ONCE
Refills: 0 | Status: COMPLETED | OUTPATIENT
Start: 2017-02-28 | End: 2017-02-28

## 2017-02-28 RX ADMIN — TAMSULOSIN HYDROCHLORIDE 0.4 MILLIGRAM(S): 0.4 CAPSULE ORAL at 21:24

## 2017-02-28 RX ADMIN — Medication 650 MILLIGRAM(S): at 05:03

## 2017-02-28 RX ADMIN — CEFTRIAXONE 100 GRAM(S): 500 INJECTION, POWDER, FOR SOLUTION INTRAMUSCULAR; INTRAVENOUS at 09:12

## 2017-02-28 RX ADMIN — Medication 200 MILLIGRAM(S): at 13:28

## 2017-02-28 RX ADMIN — SODIUM CHLORIDE 50 MILLILITER(S): 9 INJECTION INTRAMUSCULAR; INTRAVENOUS; SUBCUTANEOUS at 13:29

## 2017-02-28 RX ADMIN — AMLODIPINE BESYLATE 10 MILLIGRAM(S): 2.5 TABLET ORAL at 05:03

## 2017-02-28 NOTE — PROGRESS NOTE ADULT - SUBJECTIVE AND OBJECTIVE BOX
CHIEF COMPLAINT/INTERVAL HISTORY:    Patient is a 51y old  Male who presents with a chief complaint of Fever and difficulty urinating (2017 11:02)      HPI:  ED provider stated: "51 year old male with h/o HTN presents today c/o fever and difficulty urinating since Friday, pt c/o urinating in dribbles especially during the night, +cough + back pains +weakness (-) chest pain (-) sob (-) palpitations (-) sore throat (-) ear pain (-) nausea or vomiting +dry mouth, normal appetite, pt has been taking tylenol which he last took last night" (2017 11:02)    Overnight issues  still febrile   SUBJECTIVE & OBJECTIVE: Pt seen and examined at bedside.   ROS:  CONSTITUTIONAL: No fever, weight loss, or fatigue  NECK: No pain or stiffness  RESPIRATORY: No cough, wheezing, chills or hemoptysis; No shortness of breath  CARDIOVASCULAR: No chest pain, palpitations, dizziness, or leg swelling  GASTROINTESTINAL: No abdominal or epigastric pain. No nausea, vomiting, or hematemesis; No diarrhea or constipation. No melena or hematochezia.  GENITOURINARY: No dysuria, frequency, hematuria, or incontinence  NEUROLOGICAL: No headaches, memory loss, loss of strength, numbness, or tremors  SKIN: No itching, burning, rashes, or lesions   ICU Vital Signs Last 24 Hrs  T(C): 36.7, Max: 38.9 ( @ 18:23)  T(F): 98.1, Max: 102 ( @ 18:23)  HR: 75 (75 - 83)  BP: 147/71 (112/70 - 147/71)  BP(mean): --  ABP: --  ABP(mean): --  RR: 18 (18 - 18)  SpO2: 97% (97% - 97%)        MEDICATIONS  (STANDING):  cefTRIAXone   IVPB  IV Intermittent   cefTRIAXone   IVPB 1Gram(s) IV Intermittent every 24 hours  amLODIPine   Tablet 10milliGRAM(s) Oral daily  sodium chloride 0.9%. 1000milliLiter(s) IV Continuous <Continuous>  tamsulosin 0.4milliGRAM(s) Oral at bedtime    MEDICATIONS  (PRN):  acetaminophen   Tablet 650milliGRAM(s) Oral every 6 hours PRN For Temp greater than 38 C (100.4 F)        PHYSICAL EXAM:    GENERAL: NAD, well-groomed, well-developed  HEAD:  Atraumatic, Normocephalic  EYES: EOMI, PERRLA, conjunctiva and sclera clear  ENMT: Moist mucous membranes  NECK: Supple, No JVD  NERVOUS SYSTEM:  Alert & Oriented X3, Motor Strength 5/5 B/L upper and lower extremities; DTRs 2+ intact and symmetric  CHEST/LUNG: Clear to auscultation bilaterally; No rales, rhonchi, wheezing, or rubs  HEART: Regular rate and rhythm; No murmurs, rubs, or gallops  ABDOMEN: Soft, Nontender, Nondistended; Bowel sounds present  EXTREMITIES:  2+ Peripheral Pulses, No clubbing, cyanosis, or edema    LABS:                        11.2   23.0  )-----------( 137      ( 2017 08:14 )             32.2     2017 08:14    141    |  109    |  9      ----------------------------<  104    3.5     |  24     |  1.24     Ca    8.5        2017 08:14    TPro  8.5    /  Alb  3.3    /  TBili  2.7    /  DBili  x      /  AST  26     /  ALT  36     /  AlkPhos  104    2017 06:15      Urinalysis Basic - ( 2017 06:17 )    Color: Yellow / Appearance: Clear / S.005 / pH: x  Gluc: x / Ketone: Negative  / Bili: Negative / Urobili: 12 mg/dL   Blood: x / Protein: 100 mg/dL / Nitrite: Negative   Leuk Esterase: Small / RBC: 0-2 /HPF / WBC 6-10   Sq Epi: x / Non Sq Epi: Few / Bacteria: Moderate        CAPILLARY BLOOD GLUCOSE      RECENT CULTURES:      RADIOLOGY & ADDITIONAL TESTS:  Imaging Personally Reviewed:  [ ] YES      Consultant(s) Notes Reviewed:  [ ] YES     Care Discussed with [ ] Consultants [X ] Patient [ ] Family  [x ]    [x ]  Other; RN  HEALTH ISSUES - PROBLEM Dx:  Dysuria: Dysuria  Frequency of urination: Frequency of urination  Acute kidney injury (nontraumatic): Acute kidney injury (nontraumatic)  Umbilical hernia without obstruction and without gangrene: Umbilical hernia without obstruction and without gangrene  Pelvic lymphadenopathy: Pelvic lymphadenopathy  Retroperitoneal lymphadenopathy: Retroperitoneal lymphadenopathy  Diverticulosis of colon without hemorrhage: Diverticulosis of colon without hemorrhage  Calculus of gallbladder without cholecystitis without obstruction: Calculus of gallbladder without cholecystitis without obstruction  Prostatic enlargement: Prostatic enlargement  Febrile illness: Febrile illness  Prostatitis, acute: Prostatitis, acute        DVT/GI ppx  Discussed with pt @ bedside

## 2017-02-28 NOTE — CONSULT NOTE ADULT - PROBLEM SELECTOR RECOMMENDATION 9
sec to complicated UTI/acute prostatitis  cont rocephin  add genta X 1 to cover MDRO   cont IVF   f/u on bc and UC
await urine culture, continue antibiotics

## 2017-02-28 NOTE — PROGRESS NOTE ADULT - SUBJECTIVE AND OBJECTIVE BOX
Patient seen and examined bedside   No new complaints offered or acute events.   NO Abdominal pain, Denies nausea and vomiting. Tolerating diet. Improved urinary symptoms      T(F): 99.6, Max: 102.6 (02-27 @ 16:25)  HR: 79 (79 - 91)  BP: 133/71 (112/70 - 169/95)  RR: 18 (17 - 18)  SpO2: 97% (97% - 98%)  Wt(kg): --  CAPILLARY BLOOD GLUCOSE      PHYSICAL EXAM:  General: NAD, WDWN.   Neuro:  Alert & oriented x 3  HEENT: NCAT, EOMI, conjunctiva clear  CV: +S1+S2 regular rate and rhythm  Lung: clear to ausculation bilaterally, respirations nonlabored, good inspiratory effort  Abdomen: soft,  non tender, no distention, + bowel sounds  Extremities: no pedal edema or calf tenderness noted   : No CVA or SP tenderness    LABS:                        11.2   23.0  )-----------( 137      ( 28 Feb 2017 08:14 )             32.2     28 Feb 2017 08:14    141    |  109    |  9      ----------------------------<  104    3.5     |  24     |  1.24     Ca    8.5        28 Feb 2017 08:14    TPro  8.5    /  Alb  3.3    /  TBili  2.7    /  DBili  x      /  AST  26     /  ALT  36     /  AlkPhos  104    27 Feb 2017 06:15      I&O's Detail    I & Os for current day (as of 28 Feb 2017 13:19)  =============================================  IN:    Oral Fluid: 1200 ml    sodium chloride 0.9%.: 250 ml    Total IN: 1450 ml  ---------------------------------------------  OUT:    Total OUT: 0 ml  ---------------------------------------------  Total NET: 1450 ml    Culture - Urine (02.27.17 @ 08:54)    Specimen Source: .Urine Clean Catch (Midstream)    Culture Results:   >100,000 CFU/ml Escherichia coli      EXAM:  US ABDOMINAL COMPLETE                            PROCEDURE DATE:  02/28/2017        INTERPRETATION:  CLINICAL INFORMATION: Fever.    PRIOR STUDY: CT dated 2/27/2017    TECHNIQUE:    Sonography of the abdomen.    FINDINGS:    Liver:     size - Mildly enlarged     echogenicity - Normal     contour - Smooth     mass - None     bile ducts - Intrahepatic and extrahepatic bile ducts not dilated with   common bile duct measuring 4 mm    Gallbladder: Small calculus; gallbladder wall not thickened; no pain   elicited over gallbladder    Pancreas: Head and body appear normal; tail obscured by bowel gas    Spleen: Normal measuring 11.2 x 4.5 x 10.7 cm    Kidneys:      right - Measures 11.2 cm in length; increased echogenicity; no   hydronephrosis     left - Measures 11.2 cm in length; increased echogenicity; no   hydronephrosis     Aorta and Inferior Vena Cava: Visualized portions appear normal    Ascites: None    IMPRESSION:    Cholelithiasis.  No biliary ductal dilatation or sonographic evidence of acute   cholecystitis.  Bilaterally increased renal cortical echogenicity compatible with medical   renal disease.  No hydronephrosis.    DERIAN BERRIOS M.D., ATTENDING RADIOLOGIST  This document has been electronically signed. Feb 28 2017  9:44AM            Impression 50y/o male with acute prostatitis, Escherichia coli UTI  Leukocytosis,  cholelithiasis, PMH HTN  -continue antibiotics  -continue to follow Intake and output  -f/u culture sensitivities  - continue flomax  - will Discuss with attendings for further intervention Patient seen and examined bedside   No new complaints offered or acute events.   NO Abdominal pain, Denies nausea and vomiting. Tolerating diet. Improved urinary symptoms      T(F): 99.6, Max: 102.6 (02-27 @ 16:25)  HR: 79 (79 - 91)  BP: 133/71 (112/70 - 169/95)  RR: 18 (17 - 18)  SpO2: 97% (97% - 98%)  Wt(kg): --  CAPILLARY BLOOD GLUCOSE      PHYSICAL EXAM:  General: NAD, WDWN.   Neuro:  Alert & oriented x 3  HEENT: NCAT, EOMI, conjunctiva clear  CV: +S1+S2 regular rate and rhythm  Lung: clear to ausculation bilaterally, respirations nonlabored, good inspiratory effort  Abdomen: soft,  non tender, no distention, + bowel sounds  Extremities: no pedal edema or calf tenderness noted   : No CVA or SP tenderness    LABS:                        11.2   23.0  )-----------( 137      ( 28 Feb 2017 08:14 )             32.2     28 Feb 2017 08:14    141    |  109    |  9      ----------------------------<  104    3.5     |  24     |  1.24     Ca    8.5        28 Feb 2017 08:14    TPro  8.5    /  Alb  3.3    /  TBili  2.7    /  DBili  x      /  AST  26     /  ALT  36     /  AlkPhos  104    27 Feb 2017 06:15      I&O's Detail    I & Os for current day (as of 28 Feb 2017 13:19)  =============================================  IN:    Oral Fluid: 1200 ml    sodium chloride 0.9%.: 250 ml    Total IN: 1450 ml  ---------------------------------------------  OUT:    Total OUT: 0 ml  ---------------------------------------------  Total NET: 1450 ml    Culture - Urine (02.27.17 @ 08:54)    Specimen Source: .Urine Clean Catch (Midstream)    Culture Results:   >100,000 CFU/ml Escherichia coli      EXAM:  US ABDOMINAL COMPLETE                            PROCEDURE DATE:  02/28/2017        INTERPRETATION:  CLINICAL INFORMATION: Fever.    PRIOR STUDY: CT dated 2/27/2017    TECHNIQUE:    Sonography of the abdomen.    FINDINGS:    Liver:     size - Mildly enlarged     echogenicity - Normal     contour - Smooth     mass - None     bile ducts - Intrahepatic and extrahepatic bile ducts not dilated with   common bile duct measuring 4 mm    Gallbladder: Small calculus; gallbladder wall not thickened; no pain   elicited over gallbladder    Pancreas: Head and body appear normal; tail obscured by bowel gas    Spleen: Normal measuring 11.2 x 4.5 x 10.7 cm    Kidneys:      right - Measures 11.2 cm in length; increased echogenicity; no   hydronephrosis     left - Measures 11.2 cm in length; increased echogenicity; no   hydronephrosis     Aorta and Inferior Vena Cava: Visualized portions appear normal    Ascites: None    IMPRESSION:    Cholelithiasis.  No biliary ductal dilatation or sonographic evidence of acute   cholecystitis.  Bilaterally increased renal cortical echogenicity compatible with medical   renal disease.  No hydronephrosis.    DERIAN BERRIOS M.D., ATTENDING RADIOLOGIST  This document has been electronically signed. Feb 28 2017  9:44AM            Impression 52y/o male with acute prostatitis, Escherichia coli UTI  Leukocytosis,  cholelithiasis, no clinical evidence of Acute Cholecystitis. PMH HTN    Plan:  -continue antibiotics, as per MD  -continue to follow Intake and output  -f/u culture sensitivities,  -Urology F/U  - continue flomax  - will Discuss with attendings for further intervention

## 2017-02-28 NOTE — CONSULT NOTE ADULT - SUBJECTIVE AND OBJECTIVE BOX
Infectious Diseases - Attending at Dr. Hui    HPI:  ED provider stated: "51 year old male with h/o HTN presents today c/o fever and difficulty urinating since Friday, pt c/o urinating in dribbles especially during the night, +cough + back pains +weakness (-) chest pain (-) sob (-) palpitations (-) sore throat (-) ear pain (-) nausea or vomiting +dry mouth, normal appetite, pt has been taking tylenol which he last took last night" (2017 11:02)  Found to be septic and has UTI with prostatitis .has been started on IV rocephin.      PAST MEDICAL & SURGICAL HISTORY:  Hypertension  No significant past surgical history      Allergies    No Known Allergies    Intolerances        FAMILY HISTORY:  No pertinent family history in first degree relatives      SOCIAL HISTORY:NO smoking ,alcoholism    REVIEW OF SYSTEMS:    Constitutional: + fever, No weight loss + fatigue  Eyes: No eye pain, visual disturbances, or discharge  ENT:  No difficulty hearing, tinnitus, vertigo; No sinus or throat pain  Neck: No pain or stiffness  Respiratory: No cough, wheezing, chills or hemoptysis  Cardiovascular: No chest pain, palpitations, shortness of breath, dizziness or leg swelling  Gastrointestinal: No abdominal or epigastric pain. No nausea, vomiting or hematemesis; No diarrhea or constipation. No melena or hematochezia.  Genitourinary:+ difficulty urinating withdribbling, No dysuria, frequency, hematuria or incontinence  Rectal: No pain, hemorrhoids or incontinence  Neurological: No headaches, memory loss, loss of strength, numbness or tremors  Skin: No itching, burning, rashes or lesions   Lymph Nodes: No enlarged glands  Endocrine: No heat or cold intolerance; No hair loss  Musculoskeletal: No joint pain or swelling; No muscle, +back pain ,No extremity pain  Psychiatric: No depression, anxiety, mood swings or difficulty sleeping  Heme/Lymph: No easy bruising or bleeding gums  Allergy and Immunologic: No hives or eczema    MEDICATIONS  (STANDING):  cefTRIAXone   IVPB  IV Intermittent   cefTRIAXone   IVPB 1Gram(s) IV Intermittent every 24 hours  amLODIPine   Tablet 10milliGRAM(s) Oral daily  sodium chloride 0.9%. 1000milliLiter(s) IV Continuous <Continuous>  tamsulosin 0.4milliGRAM(s) Oral at bedtime    MEDICATIONS  (PRN):  acetaminophen   Tablet 650milliGRAM(s) Oral every 6 hours PRN For Temp greater than 38 C (100.4 F)      Vital Signs Last 24 Hrs  T(C): 36.7, Max: 38.4 ( @ 04:35)  T(F): 98.1, Max: 101.2 ( @ 04:35)  HR: 75 (75 - 83)  BP: 147/71 (133/71 - 147/71)  BP(mean): --  RR: 18 (18 - 18)  SpO2: 97% (97% - 97%)    PHYSICAL EXAM:    Constitutional: NAD, well-groomed, well-developed  HEENT: PERRLA, EOMI, Normal Hearing, MMM  Neck: No LAD, No JVD  Back: Normal spine flexure, No CVA tenderness  Respiratory: CTAB/L  Cardiovascular: S1 and S2, RRR, no M/G/R  Gastrointestinal: BS+, soft, NT/ND  Extremities: No peripheral edema  Vascular: 2+ peripheral pulses  Neurological: A/O x 3, no focal deficits  Skin: No rashes      LABS:                        11.2   23.0  )-----------( 137      ( 2017 08:14 )             32.2     2017 08:14    141    |  109    |  9      ----------------------------<  104    3.5     |  24     |  1.24     Ca    8.5        2017 08:14    TPro  8.5    /  Alb  3.3    /  TBili  2.7    /  DBili  x      /  AST  26     /  ALT  36     /  AlkPhos  104    2017 06:15      Urinalysis Basic - ( 2017 06:17 )    Color: Yellow / Appearance: Clear / S.005 / pH: x  Gluc: x / Ketone: Negative  / Bili: Negative / Urobili: 12 mg/dL   Blood: x / Protein: 100 mg/dL / Nitrite: Negative   Leuk Esterase: Small / RBC: 0-2 /HPF / WBC 6-10   Sq Epi: x / Non Sq Epi: Few / Bacteria: Moderate        MICROBIOLOGY:  RECENT CULTURES:   .Blood Blood-Peripheral XXXX XXXX   No growth to date.     .Urine Clean Catch (Midstream) XXXX XXXX   >100,000 CFU/ml Escherichia coli     .Blood Blood-Peripheral XXXX XXXX   No growth to date.          RADIOLOGY & ADDITIONAL STUDIES  CT abdomen /pelvis:No urolithiasis or hydronephrosis.  Mild retroperitoneal and pelvic adenopathy.  Enlarged prostate gland with periprostatic inflammatory changes,   suspicious for prostatitis.

## 2017-03-01 LAB
-  AMIKACIN: SIGNIFICANT CHANGE UP
-  AMPICILLIN/SULBACTAM: SIGNIFICANT CHANGE UP
-  AMPICILLIN: SIGNIFICANT CHANGE UP
-  AZTREONAM: SIGNIFICANT CHANGE UP
-  CEFAZOLIN: SIGNIFICANT CHANGE UP
-  CEFEPIME: SIGNIFICANT CHANGE UP
-  CEFOXITIN: SIGNIFICANT CHANGE UP
-  CEFTAZIDIME: SIGNIFICANT CHANGE UP
-  CEFTRIAXONE: SIGNIFICANT CHANGE UP
-  CIPROFLOXACIN: SIGNIFICANT CHANGE UP
-  ERTAPENEM: SIGNIFICANT CHANGE UP
-  GENTAMICIN: SIGNIFICANT CHANGE UP
-  IMIPENEM: SIGNIFICANT CHANGE UP
-  LEVOFLOXACIN: SIGNIFICANT CHANGE UP
-  MEROPENEM: SIGNIFICANT CHANGE UP
-  NITROFURANTOIN: SIGNIFICANT CHANGE UP
-  PIPERACILLIN/TAZOBACTAM: SIGNIFICANT CHANGE UP
-  TOBRAMYCIN: SIGNIFICANT CHANGE UP
-  TRIMETHOPRIM/SULFAMETHOXAZOLE: SIGNIFICANT CHANGE UP
ALBUMIN SERPL ELPH-MCNC: 2.8 G/DL — LOW (ref 3.3–5)
ALP SERPL-CCNC: 88 U/L — SIGNIFICANT CHANGE UP (ref 40–120)
ALT FLD-CCNC: 33 U/L — SIGNIFICANT CHANGE UP (ref 12–78)
ANION GAP SERPL CALC-SCNC: 9 MMOL/L — SIGNIFICANT CHANGE UP (ref 5–17)
AST SERPL-CCNC: 22 U/L — SIGNIFICANT CHANGE UP (ref 15–37)
BILIRUB DIRECT SERPL-MCNC: 0.19 MG/DL — SIGNIFICANT CHANGE UP (ref 0.05–0.2)
BILIRUB INDIRECT FLD-MCNC: 0.7 MG/DL — SIGNIFICANT CHANGE UP (ref 0.2–1)
BILIRUB SERPL-MCNC: 0.9 MG/DL — SIGNIFICANT CHANGE UP (ref 0.2–1.2)
BUN SERPL-MCNC: 10 MG/DL — SIGNIFICANT CHANGE UP (ref 7–23)
CALCIUM SERPL-MCNC: 8.5 MG/DL — SIGNIFICANT CHANGE UP (ref 8.5–10.1)
CHLORIDE SERPL-SCNC: 109 MMOL/L — HIGH (ref 96–108)
CO2 SERPL-SCNC: 23 MMOL/L — SIGNIFICANT CHANGE UP (ref 22–31)
CREAT SERPL-MCNC: 1.18 MG/DL — SIGNIFICANT CHANGE UP (ref 0.5–1.3)
CULTURE RESULTS: SIGNIFICANT CHANGE UP
GLUCOSE SERPL-MCNC: 108 MG/DL — HIGH (ref 70–99)
HCT VFR BLD CALC: 32.7 % — LOW (ref 39–50)
HGB BLD-MCNC: 10.8 G/DL — LOW (ref 13–17)
MAGNESIUM SERPL-MCNC: 2.2 MG/DL — SIGNIFICANT CHANGE UP (ref 1.8–2.4)
MCHC RBC-ENTMCNC: 33.2 GM/DL — SIGNIFICANT CHANGE UP (ref 32–36)
MCHC RBC-ENTMCNC: 33.7 PG — SIGNIFICANT CHANGE UP (ref 27–34)
MCV RBC AUTO: 101.4 FL — HIGH (ref 80–100)
METHOD TYPE: SIGNIFICANT CHANGE UP
ORGANISM # SPEC MICROSCOPIC CNT: SIGNIFICANT CHANGE UP
ORGANISM # SPEC MICROSCOPIC CNT: SIGNIFICANT CHANGE UP
PHOSPHATE SERPL-MCNC: 2.1 MG/DL — LOW (ref 2.5–4.5)
PLATELET # BLD AUTO: 166 K/UL — SIGNIFICANT CHANGE UP (ref 150–400)
POTASSIUM SERPL-MCNC: 3.8 MMOL/L — SIGNIFICANT CHANGE UP (ref 3.5–5.3)
POTASSIUM SERPL-SCNC: 3.8 MMOL/L — SIGNIFICANT CHANGE UP (ref 3.5–5.3)
PROT SERPL-MCNC: 7.7 GM/DL — SIGNIFICANT CHANGE UP (ref 6–8.3)
RBC # BLD: 3.22 M/UL — LOW (ref 4.2–5.8)
RBC # FLD: 12.1 % — SIGNIFICANT CHANGE UP (ref 11–15)
SODIUM SERPL-SCNC: 141 MMOL/L — SIGNIFICANT CHANGE UP (ref 135–145)
SPECIMEN SOURCE: SIGNIFICANT CHANGE UP
WBC # BLD: 14.2 K/UL — HIGH (ref 3.8–10.5)
WBC # FLD AUTO: 14.2 K/UL — HIGH (ref 3.8–10.5)

## 2017-03-01 PROCEDURE — 99233 SBSQ HOSP IP/OBS HIGH 50: CPT

## 2017-03-01 PROCEDURE — 99232 SBSQ HOSP IP/OBS MODERATE 35: CPT

## 2017-03-01 RX ADMIN — TAMSULOSIN HYDROCHLORIDE 0.4 MILLIGRAM(S): 0.4 CAPSULE ORAL at 21:16

## 2017-03-01 RX ADMIN — AMLODIPINE BESYLATE 10 MILLIGRAM(S): 2.5 TABLET ORAL at 05:50

## 2017-03-01 RX ADMIN — CEFTRIAXONE 100 GRAM(S): 500 INJECTION, POWDER, FOR SOLUTION INTRAMUSCULAR; INTRAVENOUS at 11:47

## 2017-03-01 NOTE — PROGRESS NOTE ADULT - SUBJECTIVE AND OBJECTIVE BOX
Patient seen and examined bedside resting comfortably.  No complaints offered.   Voiding spontaneously. No dysuria. Still with mild hesitancy.  Denies nausea and vomiting. No fever/chills.    T(F): 98, Max: 98.8 (02-28 @ 23:25)  HR: 68 (68 - 84)  BP: 121/68 (121/68 - 143/71)  RR: 15 (15 - 16)  SpO2: 98% (96% - 98%)    PHYSICAL EXAM:  General: NAD, WDWN  Neuro:  Alert & oriented x 3  HEENT: NCAT, EOMI, conjunctiva clear  Chest: respirations nonlabored, good inspiratory effort  Abdomen: soft, NTND.   Extremities: no pedal edema noted   : no suprapubic tenderness    LABS:                        10.8   14.2  )-----------( 166      ( 01 Mar 2017 07:08 )             32.7   01 Mar 2017 07:08    141    |  109    |  10     ----------------------------<  108    3.8     |  23     |  1.18     Ca    8.5        01 Mar 2017 07:08  Phos  2.1       01 Mar 2017 07:08  Mg     2.2       01 Mar 2017 07:08    TPro  7.7    /  Alb  2.8    /  TBili  0.9    /  DBili  .19    /  AST  22     /  ALT  33     /  AlkPhos  88     01 Mar 2017 07:08    Culture - Urine (02.27.17 @ 08:54)    -  Cefazolin: S <=8    -  Ceftazidime: S <=1    -  Meropenem: S <=1    -  Tobramycin: S <=4    -  Amikacin: S <=16    -  Ampicillin/Sulbactam: S <=8/4    -  Aztreonam: S <=4    -  Cefoxitin: S <=8    -  Trimethoprim/Sulfamethoxazole: S <=2/38    -  Ampicillin: S <=8    -  Cefepime: S <=4    -  Ceftriaxone: S <=1    -  Ciprofloxacin: S <=1    -  Ertapenem: S <=1    -  Gentamicin: S <=4    -  Imipenem: S <=1    -  Levofloxacin: S <=2    -  Nitrofurantoin: S <=32    -  Piperacillin/Tazobactam: S <=16    Specimen Source: .Urine Clean Catch (Midstream)    Culture Results:   >100,000 CFU/ml Escherichia coli    Organism Identification: Escherichia coli    Organism: Escherichia coli    Method Type: KIRILL      Impression 52y/o male with acute prostatitis, Escherichia coli UTI, cholelithiasis, no clinical evidence of Acute Cholecystitis. PMH HTN    Plan:  -continue antibiotics, per Dr Calderón, continue on discharge  -discussed with Dr Andres  -for discharge tomorrow if remains afebrile  -discussed outpatient follow up with patient and wife at bedside. All questions/concerns addressed.

## 2017-03-01 NOTE — PROGRESS NOTE ADULT - ASSESSMENT
HEALTH ISSUES - PROBLEM Dx:  Acute prostatitis without hematuria  Sepsis due to Escherichia coli (E. coli):   Dysuria: Dysuria  Frequency of urination  Umbilical hernia without obstruction and without gangrene: Umbilical hernia without obstruction and without gangrene  Calculus of gallbladder without cholecystitis without obstruction.  Prostatic enlargement: Prostatic enlargement  Febrile illness: Febrile illness improving    Plan:    Continue IV antibx as per ID.  Urology   F/U.  No Surgical intervention needed at this time.  Will sign off.  Thank you.

## 2017-03-01 NOTE — PROGRESS NOTE ADULT - SUBJECTIVE AND OBJECTIVE BOX
Dr. Arce's progress note:    Patient OOB, feeling much better, moderate difficult urinating.  Fever improved. denies  abdominal pain, nausea or vomiting      SUBJECTIVE:  Flatus: [ x] YES [ ] NO             Bowel Movement: x[ ] YES [ ] NO  Pain (0-10):   0         Pain Control Adequate: [x ] YES [ ] NO  Nausea: [ ] YES [x ] NO            Vomiting: [ ] YES [x ] NO  Diarrhea: [ ] YES [x ] NO         Constipation: [ ] YES [x ] NO     Chest Pain: [ ] YES [x ] NO    SOB:  [ ] YES [x ] NO    MEDICATIONS  (STANDING):  amLODIPine   Tablet 10milliGRAM(s) Oral daily  tamsulosin 0.4milliGRAM(s) Oral at bedtime  levoFLOXacin  Tablet 500milliGRAM(s) Oral every 24 hours    MEDICATIONS  (PRN):  acetaminophen   Tablet 650milliGRAM(s) Oral every 6 hours PRN For Temp greater than 38 C (100.4 F)    Vital Signs Last 24 Hrs  T(C): 36.7, Max: 37.1 (02-28 @ 23:25)  T(F): 98, Max: 98.8 (02-28 @ 23:25)  HR: 73 (70 - 84)  BP: 122/72 (122/72 - 147/71)  BP(mean): --  RR: 16 (15 - 18)  SpO2: 97% (96% - 97%)  I&O's Summary  I & Os for 24h ending 01 Mar 2017 07:00  =============================================  IN: 2280 ml / OUT: 0 ml / NET: 2280 ml    I & Os for current day (as of 01 Mar 2017 16:03)  =============================================  IN: 410 ml / OUT: 0 ml / NET: 410 ml    PHYSICAL EXAM:  Constitutional: Patient well nourish. well developed.  Eyes:  PERRL, EOMI, Conjunctiva clear.  ENMT:  WNL  Neck:  Supple.  Respiratory:  Lungs CTA, B/L, no rales , no wheezing, no rhonchi.  Cardiovascular:  S1, S2, RRR  Gastrointestinal: Abdomen soft, non distended, + BS, non tenderness  Genitourinary:  Normal.  Rectal:   Extremities:  No edema, no calf tenderrness,  Neurological: AxAxOx3                    LABS:                        10.8   14.2  )-----------( 166      ( 01 Mar 2017 07:08 )             32.7     01 Mar 2017 07:08    141    |  109    |  10     ----------------------------<  108    3.8     |  23     |  1.18     Ca    8.5        01 Mar 2017 07:08  Phos  2.1       01 Mar 2017 07:08  Mg     2.2       01 Mar 2017 07:08    TPro  7.7    /  Alb  2.8    /  TBili  0.9    /  DBili  .19    /  AST  22     /  ALT  33     /  AlkPhos  88     01 Mar 2017 07:08      LIVER FUNCTIONS - ( 01 Mar 2017 07:08 )  Alb: 2.8 g/dL / Pro: 7.7 gm/dL / ALK PHOS: 88 U/L / ALT: 33 U/L / AST: 22 U/L / GGT: x               RADIOLOGY & ADDITIONAL STUDIES:

## 2017-03-01 NOTE — PROGRESS NOTE ADULT - SUBJECTIVE AND OBJECTIVE BOX
Patient is a 51y old  Male who presents with a chief complaint of Fever and difficulty urinating (27 Feb 2017 11:02)      INTERVAL HPI / OVERNIGHT EVENTS:fever resolved,still has some difficulty urinating.no dysuria though back pain resolved    MEDICATIONS  (STANDING):  amLODIPine   Tablet 10milliGRAM(s) Oral daily  sodium chloride 0.9%. 1000milliLiter(s) IV Continuous <Continuous>  tamsulosin 0.4milliGRAM(s) Oral at bedtime  levoFLOXacin  Tablet 500milliGRAM(s) Oral every 24 hours    MEDICATIONS  (PRN):  acetaminophen   Tablet 650milliGRAM(s) Oral every 6 hours PRN For Temp greater than 38 C (100.4 F)      Vital Signs Last 24 Hrs  T(C): 36.7, Max: 37.1 (02-28 @ 23:25)  T(F): 98, Max: 98.8 (02-28 @ 23:25)  HR: 73 (70 - 84)  BP: 122/72 (122/72 - 147/71)  BP(mean): --  RR: 16 (15 - 18)  SpO2: 97% (96% - 97%)    PHYSICAL EXAM:    Constitutional: NAD, well-groomed, well-developed  Respiratory: CTAB/L  Cardiovascular: S1 and S2, RRR, no M/G/R  Gastrointestinal: BS+, soft, NT/ND  Extremities: No peripheral edema  Vascular: 2+ peripheral pulses  Skin: No rashes      LABS:                        10.8   14.2  )-----------( 166      ( 01 Mar 2017 07:08 )             32.7     01 Mar 2017 07:08    141    |  109    |  10     ----------------------------<  108    3.8     |  23     |  1.18     Ca    8.5        01 Mar 2017 07:08  Phos  2.1       01 Mar 2017 07:08  Mg     2.2       01 Mar 2017 07:08    TPro  7.7    /  Alb  2.8    /  TBili  0.9    /  DBili  .19    /  AST  22     /  ALT  33     /  AlkPhos  88     01 Mar 2017 07:08            MICROBIOLOGY:  RECENT CULTURES:  02-27 .Blood Blood-Peripheral XXXX XXXX   No growth to date.    02-27 .Urine Clean Catch (Midstream) Escherichia coli XXXX   >100,000 CFU/ml Escherichia coli    02-27 .Blood Blood-Peripheral XXXX XXXX   No growth to date.          RADIOLOGY & ADDITIONAL STUDIES:

## 2017-03-01 NOTE — PROGRESS NOTE ADULT - SUBJECTIVE AND OBJECTIVE BOX
CHIEF COMPLAINT/INTERVAL HISTORY:    Patient is a 51y old  Male who presents with a chief complaint of Fever and difficulty urinating (27 Feb 2017 11:02)      HPI:  ED provider stated: "51 year old male with h/o HTN presents today c/o fever and difficulty urinating since Friday, pt c/o urinating in dribbles especially during the night, +cough + back pains +weakness (-) chest pain (-) sob (-) palpitations (-) sore throat (-) ear pain (-) nausea or vomiting +dry mouth, normal appetite, pt has been taking tylenol which he last took last night" (27 Feb 2017 11:02)    Overnight issues  feeling clinically improved   urinating better  SUBJECTIVE & OBJECTIVE: Pt seen and examined at bedside.   ROS:  CONSTITUTIONAL: No fever, weight loss, or fatigue  NECK: No pain or stiffness  RESPIRATORY: No cough, wheezing, chills or hemoptysis; No shortness of breath  CARDIOVASCULAR: No chest pain, palpitations, dizziness, or leg swelling  GASTROINTESTINAL: No abdominal or epigastric pain. No nausea, vomiting, or hematemesis; No diarrhea or constipation. No melena or hematochezia.  GENITOURINARY: No dysuria, frequency, hematuria, or incontinence  NEUROLOGICAL: No headaches, memory loss, loss of strength, numbness, or tremors  SKIN: No itching, burning, rashes, or lesions   ICU Vital Signs Last 24 Hrs  T(C): 36.7, Max: 37.1 (02-28 @ 23:25)  T(F): 98, Max: 98.8 (02-28 @ 23:25)  HR: 73 (70 - 84)  BP: 122/72 (122/72 - 147/71)  BP(mean): --  ABP: --  ABP(mean): --  RR: 16 (15 - 18)  SpO2: 97% (96% - 97%)        MEDICATIONS  (STANDING):  amLODIPine   Tablet 10milliGRAM(s) Oral daily  sodium chloride 0.9%. 1000milliLiter(s) IV Continuous <Continuous>  tamsulosin 0.4milliGRAM(s) Oral at bedtime  levoFLOXacin  Tablet 500milliGRAM(s) Oral every 24 hours    MEDICATIONS  (PRN):  acetaminophen   Tablet 650milliGRAM(s) Oral every 6 hours PRN For Temp greater than 38 C (100.4 F)        PHYSICAL EXAM:    GENERAL: NAD, well-groomed, well-developed  HEAD:  Atraumatic, Normocephalic  EYES: EOMI, PERRLA, conjunctiva and sclera clear  ENMT: Moist mucous membranes  NECK: Supple, No JVD  NERVOUS SYSTEM:  Alert & Oriented X3, Motor Strength 5/5 B/L upper and lower extremities; DTRs 2+ intact and symmetric  CHEST/LUNG: Clear to auscultation bilaterally; No rales, rhonchi, wheezing, or rubs  HEART: Regular rate and rhythm; No murmurs, rubs, or gallops  ABDOMEN: Soft, Nontender, Nondistended; Bowel sounds present  EXTREMITIES:  2+ Peripheral Pulses, No clubbing, cyanosis, or edema    LABS:                        10.8   14.2  )-----------( 166      ( 01 Mar 2017 07:08 )             32.7     01 Mar 2017 07:08    141    |  109    |  10     ----------------------------<  108    3.8     |  23     |  1.18     Ca    8.5        01 Mar 2017 07:08  Phos  2.1       01 Mar 2017 07:08  Mg     2.2       01 Mar 2017 07:08    TPro  7.7    /  Alb  2.8    /  TBili  0.9    /  DBili  .19    /  AST  22     /  ALT  33     /  AlkPhos  88     01 Mar 2017 07:08          CAPILLARY BLOOD GLUCOSE      RECENT CULTURES:      RADIOLOGY & ADDITIONAL TESTS:  Imaging Personally Reviewed:  [ ] YES      Consultant(s) Notes Reviewed:  [ ] YES     Care Discussed with [ ] Consultants [X ] Patient [ ] Family  [x ]    [x ]  Other; RN  HEALTH ISSUES - PROBLEM Dx:  Acute prostatitis without hematuria: Acute prostatitis without hematuria  Sepsis due to Escherichia coli (E. coli): Sepsis due to Escherichia coli (E. coli)  Dysuria: Dysuria  Frequency of urination: Frequency of urination  Acute kidney injury (nontraumatic): Acute kidney injury (nontraumatic)  Umbilical hernia without obstruction and without gangrene: Umbilical hernia without obstruction and without gangrene  Pelvic lymphadenopathy: Pelvic lymphadenopathy  Retroperitoneal lymphadenopathy: Retroperitoneal lymphadenopathy  Diverticulosis of colon without hemorrhage: Diverticulosis of colon without hemorrhage  Calculus of gallbladder without cholecystitis without obstruction: Calculus of gallbladder without cholecystitis without obstruction  Prostatic enlargement: Prostatic enlargement  Febrile illness: Febrile illness  Prostatitis, acute: Prostatitis, acute        DVT/GI ppx  Discussed with pt @ bedside

## 2017-03-02 VITALS
SYSTOLIC BLOOD PRESSURE: 141 MMHG | DIASTOLIC BLOOD PRESSURE: 93 MMHG | OXYGEN SATURATION: 96 % | RESPIRATION RATE: 16 BRPM | TEMPERATURE: 98 F | HEART RATE: 80 BPM

## 2017-03-02 DIAGNOSIS — I10 ESSENTIAL (PRIMARY) HYPERTENSION: ICD-10-CM

## 2017-03-02 LAB
HCT VFR BLD CALC: 34 % — LOW (ref 39–50)
HGB BLD-MCNC: 11.5 G/DL — LOW (ref 13–17)
MCHC RBC-ENTMCNC: 33.4 PG — SIGNIFICANT CHANGE UP (ref 27–34)
MCHC RBC-ENTMCNC: 33.8 GM/DL — SIGNIFICANT CHANGE UP (ref 32–36)
MCV RBC AUTO: 99.1 FL — SIGNIFICANT CHANGE UP (ref 80–100)
PLATELET # BLD AUTO: 196 K/UL — SIGNIFICANT CHANGE UP (ref 150–400)
RBC # BLD: 3.43 M/UL — LOW (ref 4.2–5.8)
RBC # FLD: 11.9 % — SIGNIFICANT CHANGE UP (ref 11–15)
WBC # BLD: 9.6 K/UL — SIGNIFICANT CHANGE UP (ref 3.8–10.5)
WBC # FLD AUTO: 9.6 K/UL — SIGNIFICANT CHANGE UP (ref 3.8–10.5)

## 2017-03-02 PROCEDURE — 99239 HOSP IP/OBS DSCHRG MGMT >30: CPT

## 2017-03-02 RX ORDER — TAMSULOSIN HYDROCHLORIDE 0.4 MG/1
1 CAPSULE ORAL
Qty: 30 | Refills: 0
Start: 2017-03-02 | End: 2017-04-01

## 2017-03-02 RX ORDER — AMLODIPINE BESYLATE 2.5 MG/1
1 TABLET ORAL
Qty: 0 | Refills: 0 | DISCHARGE
Start: 2017-03-02

## 2017-03-02 RX ADMIN — AMLODIPINE BESYLATE 10 MILLIGRAM(S): 2.5 TABLET ORAL at 05:32

## 2017-03-02 NOTE — DISCHARGE NOTE ADULT - CARE PROVIDER_API CALL
Anthony Andres), Urology  [address] [place] [address]  Phone: [phone]  Fax: [phone]    Catherine Calderón (JARET), Infectious Disease; Internal Medicine  [address] [place] [address]  Phone: [phone]  Fax: [phone]

## 2017-03-02 NOTE — DISCHARGE NOTE ADULT - HOSPITAL COURSE
ED provider stated: "51 year old male with h/o HTN presents today c/o fever and difficulty urinating since Friday, pt c/o urinating in dribbles especially during the night, +cough + back pains +weakness (-) chest pain (-) sob (-) palpitations (-) sore throat (-) ear pain (-) nausea or vomiting +dry mouth, normal appetite, pt has been taking tylenol which he last took last night". patient found to have sepsis secondary to ecoli prostatitis and was started on antibiotics . he clinically improved and was Follow up by infectious disease and urology . when he improved he was discharge

## 2017-03-02 NOTE — PROGRESS NOTE ADULT - PROBLEM SELECTOR PROBLEM 4
Prostatic enlargement
Calculus of gallbladder without cholecystitis without obstruction
Calculus of gallbladder without cholecystitis without obstruction
Hypertension

## 2017-03-02 NOTE — PROGRESS NOTE ADULT - SUBJECTIVE AND OBJECTIVE BOX
Patient seen and examined bedside resting comfortably.  No complaints offered.   Voiding spontaenously without difficulty.  Denies hematuria and dysuria.    T(F): 97.8, Max: 98.8 (03-01 @ 23:59)  HR: 80 (67 - 80)  BP: 141/93 (118/61 - 141/93)  RR: 16 (15 - 16)  SpO2: 96% (96% - 98%)    PHYSICAL EXAM:  General: NAD, WDWN  Neuro:  Alert & oriented x 3  HEENT: NCAT, EOMI, conjunctiva clear  CV: +S1S2 regular rate and rhythm  Lung: respirations nonlabored, good inspiratory effort  Abdomen: soft, NTND. Normactive BS  Extremities: no pedal edema or calf tenderness noted   : uncircumcised phallus, adequate meatus.     LABS:                        11.5   9.6   )-----------( 196      ( 02 Mar 2017 07:46 )             34.0     01 Mar 2017 07:08    141    |  109    |  10     ----------------------------<  108    3.8     |  23     |  1.18     Ca    8.5        01 Mar 2017 07:08  Phos  2.1       01 Mar 2017 07:08  Mg     2.2       01 Mar 2017 07:08    TPro  7.7    /  Alb  2.8    /  TBili  0.9    /  DBili  .19    /  AST  22     /  ALT  33     /  AlkPhos  88     01 Mar 2017 07:08      I&O's Detail    I & Os for current day (as of 02 Mar 2017 10:29)  =============================================  IN:    sodium chloride 0.9%: 450 ml    Oral Fluid: 360 ml    IV PiggyBack: 50 ml    Total IN: 860 ml  ---------------------------------------------  OUT:    Total OUT: 0 ml  ---------------------------------------------  Total NET: 860 ml

## 2017-03-02 NOTE — DISCHARGE NOTE ADULT - MEDICATION SUMMARY - MEDICATIONS TO TAKE
I will START or STAY ON the medications listed below when I get home from the hospital:    Ultracet  -- 1   every 6 hours  -- Indication: For Pain    tamsulosin 0.4 mg oral capsule  -- 1 cap(s) by mouth once a day (at bedtime)  -- Indication: For Prostatic enlargement    amLODIPine 10 mg oral tablet  -- 1 tab(s) by mouth once a day  -- Indication: For hypertension    levoFLOXacin 500 mg oral tablet  -- 1 tab(s) by mouth every 24 hours  -- Indication: For Sepsis due to Escherichia coli (E. coli)

## 2017-03-02 NOTE — PROGRESS NOTE ADULT - PROBLEM SELECTOR PROBLEM 2
Acute prostatitis without hematuria
Febrile illness
Febrile illness
Sepsis due to Escherichia coli (E. coli)

## 2017-03-02 NOTE — DISCHARGE NOTE ADULT - PATIENT PORTAL LINK FT
“You can access the FollowHealth Patient Portal, offered by [facility], by registering with the following website: [email or web address]”

## 2017-03-02 NOTE — DISCHARGE NOTE ADULT - CARE PLAN
Principal Discharge DX:	Sepsis due to Escherichia coli (E. coli)  Goal:	no infection  Instructions for follow-up, activity and diet:	continue meds  Follow up with urologist and infectious disease  Secondary Diagnosis:	Prostatic enlargement  Secondary Diagnosis:	Prostatitis, acute  Secondary Diagnosis:	Essential hypertension  Secondary Diagnosis:	Frequency of urination

## 2017-03-02 NOTE — PROGRESS NOTE ADULT - PROBLEM SELECTOR PLAN 2
Follow up blood cultures
so far NEGATIVE
resolving with antibiotics. Secondary to Acute prostatitis
improving  switch to oral levaquin for 3 weeks  I will see him in office in 3 weeks,repeat a UC then before stopping antibiotics  Probiotics while on abx

## 2017-03-02 NOTE — PROGRESS NOTE ADULT - PROBLEM SELECTOR PLAN 1
Admit, IV ceftriaxone.  Dr. Andres to follow
switched to levaquin  monitor overnight -if afebrile to discharge   Dr. Andres to follow
continue Levaquin 500 mg po daily for 3 weeks
resolved.due to uti

## 2017-03-02 NOTE — PROGRESS NOTE ADULT - ASSESSMENT
pt had acute prostatitis secondary to pansensitive E. coli. will be treated with Levaquin 500 mg po daily for 3 weeks and follow as an outpatient

## 2017-03-02 NOTE — PROGRESS NOTE ADULT - PROBLEM SELECTOR PROBLEM 3
Acute kidney injury (nontraumatic)
Prostatic enlargement
Prostatic enlargement
Frequency of urination

## 2017-03-04 LAB
CULTURE RESULTS: SIGNIFICANT CHANGE UP
CULTURE RESULTS: SIGNIFICANT CHANGE UP
SPECIMEN SOURCE: SIGNIFICANT CHANGE UP
SPECIMEN SOURCE: SIGNIFICANT CHANGE UP

## 2017-03-06 DIAGNOSIS — R30.0 DYSURIA: ICD-10-CM

## 2017-03-06 DIAGNOSIS — N40.0 BENIGN PROSTATIC HYPERPLASIA WITHOUT LOWER URINARY TRACT SYMPTOMS: ICD-10-CM

## 2017-03-06 DIAGNOSIS — K42.9 UMBILICAL HERNIA WITHOUT OBSTRUCTION OR GANGRENE: ICD-10-CM

## 2017-03-06 DIAGNOSIS — N39.0 URINARY TRACT INFECTION, SITE NOT SPECIFIED: ICD-10-CM

## 2017-03-06 DIAGNOSIS — N17.9 ACUTE KIDNEY FAILURE, UNSPECIFIED: ICD-10-CM

## 2017-03-06 DIAGNOSIS — I10 ESSENTIAL (PRIMARY) HYPERTENSION: ICD-10-CM

## 2017-03-06 DIAGNOSIS — K57.90 DIVERTICULOSIS OF INTESTINE, PART UNSPECIFIED, WITHOUT PERFORATION OR ABSCESS WITHOUT BLEEDING: ICD-10-CM

## 2017-03-06 DIAGNOSIS — B96.20 UNSPECIFIED ESCHERICHIA COLI [E. COLI] AS THE CAUSE OF DISEASES CLASSIFIED ELSEWHERE: ICD-10-CM

## 2017-03-06 DIAGNOSIS — A41.51 SEPSIS DUE TO ESCHERICHIA COLI [E. COLI]: ICD-10-CM

## 2017-03-06 DIAGNOSIS — N41.0 ACUTE PROSTATITIS: ICD-10-CM

## 2017-03-06 DIAGNOSIS — R59.0 LOCALIZED ENLARGED LYMPH NODES: ICD-10-CM

## 2017-03-06 DIAGNOSIS — R35.0 FREQUENCY OF MICTURITION: ICD-10-CM

## 2017-03-06 DIAGNOSIS — K80.20 CALCULUS OF GALLBLADDER WITHOUT CHOLECYSTITIS WITHOUT OBSTRUCTION: ICD-10-CM

## 2017-03-21 PROBLEM — Z00.00 ENCOUNTER FOR PREVENTIVE HEALTH EXAMINATION: Noted: 2017-03-21

## 2017-03-22 ENCOUNTER — APPOINTMENT (OUTPATIENT)
Dept: INFECTIOUS DISEASE | Facility: CLINIC | Age: 52
End: 2017-03-22

## 2017-03-22 ENCOUNTER — OUTPATIENT (OUTPATIENT)
Dept: OUTPATIENT SERVICES | Facility: HOSPITAL | Age: 52
LOS: 1 days | Discharge: HOME | End: 2017-03-22

## 2017-03-22 DIAGNOSIS — N39.0 URINARY TRACT INFECTION, SITE NOT SPECIFIED: ICD-10-CM

## 2017-03-22 LAB
APPEARANCE UR: CLEAR — SIGNIFICANT CHANGE UP
BILIRUB UR-MCNC: NEGATIVE — SIGNIFICANT CHANGE UP
COLOR SPEC: YELLOW — SIGNIFICANT CHANGE UP
DIFF PNL FLD: NEGATIVE — SIGNIFICANT CHANGE UP
EPI CELLS # UR: SIGNIFICANT CHANGE UP
GLUCOSE UR QL: NEGATIVE MG/DL — SIGNIFICANT CHANGE UP
KETONES UR-MCNC: NEGATIVE — SIGNIFICANT CHANGE UP
LEUKOCYTE ESTERASE UR-ACNC: NEGATIVE — SIGNIFICANT CHANGE UP
NITRITE UR-MCNC: NEGATIVE — SIGNIFICANT CHANGE UP
PH UR: 7 — SIGNIFICANT CHANGE UP (ref 4.8–8)
PROT UR-MCNC: NEGATIVE MG/DL — SIGNIFICANT CHANGE UP
RBC CASTS # UR COMP ASSIST: SIGNIFICANT CHANGE UP /HPF (ref 0–4)
SP GR SPEC: 1 — LOW (ref 1.01–1.02)
UROBILINOGEN FLD QL: NEGATIVE MG/DL — SIGNIFICANT CHANGE UP
WBC UR QL: SIGNIFICANT CHANGE UP /HPF (ref 0–5)

## 2018-01-23 NOTE — ED PROVIDER NOTE - NSTIMEPROVIDERCAREINITIATE_GEN_ER
Plan    1  3 - DEVELOPMENTAL PEDIATRICIAN Co-Management  *  Status: Hold For - Scheduling    Requested for: [id number]  are Referring to a non-SL Preferred Provider : Established Patient  Care Summary provided  : Yes    Signatures   Electronically signed by : Hardy Aguirre MD; Dec  5 2017  3:39PM EST                       (Author) 27-Feb-2017 05:24

## 2018-08-02 NOTE — ED PROVIDER NOTE - HEAD, MLM
pm       No current facility-administered medications for this visit. Allergies:  hehas No Known Allergies. ROS:  CV:  Denies chest pain; palpitations; shortness of breath; swelling of feet, ankles; and loss of consciousness. CON: Denies fever and dizziness. ENT:  Denies hearing loss / ringing, ear infections hoarseness, and swallowing problems. RESP:  Denies chronic cough, spitting up blood, and asthma/wheezing. GI: Denies abdominal pain, change in bowel habits, nausea or vomiting, and blood in stools. :  Denies frequent urination, burning or painful urination, blood in the urine, and bladder incontinence. NEURO:  Denies headache, memory loss, sleep disturbance, and tremor or movement disorder. PHYSICAL EXAM:   /78   Pulse 64   Ht 5' 9\" (1.753 m)   Wt 164 lb (74.4 kg)   BMI 24.22 kg/m²   GENERAL: Ben Littlejohn is a 13 y.o. male who is alert and oriented and sitting comfortably in our office. SKIN:  Intact without rashes, lesions or ulcerations. NEURO: Sensation to the extremity is intact. VASC:  Capillary refill is less than 3 seconds. Distal pulses are palpable. There is no lymphadenopathy. Knee Exam  Musculoskeletal/Neurologic:  Inspection-Swelling: moderate and significant, Ecchymosis: no  Palpation-Tenderness:lateral and patellofemoral compartment  Pain with patellar grind: no  ROM- 0-120  Strength- WNL  Sensation-normal to light touch    Special Tests-  Varus Laxity: negative   Valgus Laxity:  negative   Anterior Drawer: positive for laxity and no firm end point on left only   Posterior Drawer: endpoint but laxity  Lachman's: positive for laxity and no firm end point on left only  Jon's:positive pain and pop laterally  Thessaly: positive    Single Leg Squat: Positive  Deep Squat: Positive  Gait: antalgic and stiff-legged    PSYCH:  Good fund of knowledge and displays understanding of exam.    RADIOLOGY: No results found.     Radiology:  4 views of the Left knee were Head is atraumatic. Head shape is symmetrical.

## 2020-03-08 ENCOUNTER — EMERGENCY (EMERGENCY)
Facility: HOSPITAL | Age: 55
LOS: 1 days | Discharge: HOME | End: 2020-03-08
Attending: INTERNAL MEDICINE | Admitting: INTERNAL MEDICINE
Payer: COMMERCIAL

## 2020-03-08 VITALS
RESPIRATION RATE: 16 BRPM | DIASTOLIC BLOOD PRESSURE: 85 MMHG | TEMPERATURE: 98 F | SYSTOLIC BLOOD PRESSURE: 140 MMHG | OXYGEN SATURATION: 97 % | HEART RATE: 65 BPM

## 2020-03-08 VITALS
SYSTOLIC BLOOD PRESSURE: 133 MMHG | OXYGEN SATURATION: 100 % | TEMPERATURE: 98 F | RESPIRATION RATE: 16 BRPM | HEART RATE: 59 BPM | DIASTOLIC BLOOD PRESSURE: 87 MMHG

## 2020-03-08 LAB
ANION GAP SERPL CALC-SCNC: 13 MMO/L — SIGNIFICANT CHANGE UP (ref 7–14)
APPEARANCE UR: CLEAR — SIGNIFICANT CHANGE UP
BACTERIA # UR AUTO: NEGATIVE — SIGNIFICANT CHANGE UP
BILIRUB UR-MCNC: NEGATIVE — SIGNIFICANT CHANGE UP
BLOOD UR QL VISUAL: NEGATIVE — SIGNIFICANT CHANGE UP
BUN SERPL-MCNC: 12 MG/DL — SIGNIFICANT CHANGE UP (ref 7–23)
CALCIUM SERPL-MCNC: 9.3 MG/DL — SIGNIFICANT CHANGE UP (ref 8.4–10.5)
CHLORIDE SERPL-SCNC: 104 MMOL/L — SIGNIFICANT CHANGE UP (ref 98–107)
CO2 SERPL-SCNC: 21 MMOL/L — LOW (ref 22–31)
COLOR SPEC: YELLOW — SIGNIFICANT CHANGE UP
CREAT SERPL-MCNC: 1.06 MG/DL — SIGNIFICANT CHANGE UP (ref 0.5–1.3)
GLUCOSE SERPL-MCNC: 105 MG/DL — HIGH (ref 70–99)
GLUCOSE UR-MCNC: NEGATIVE — SIGNIFICANT CHANGE UP
HCT VFR BLD CALC: 38.9 % — LOW (ref 39–50)
HGB BLD-MCNC: 12.6 G/DL — LOW (ref 13–17)
HYALINE CASTS # UR AUTO: NEGATIVE — SIGNIFICANT CHANGE UP
KETONES UR-MCNC: NEGATIVE — SIGNIFICANT CHANGE UP
LEUKOCYTE ESTERASE UR-ACNC: SIGNIFICANT CHANGE UP
MCHC RBC-ENTMCNC: 32.4 % — SIGNIFICANT CHANGE UP (ref 32–36)
MCHC RBC-ENTMCNC: 32.8 PG — SIGNIFICANT CHANGE UP (ref 27–34)
MCV RBC AUTO: 101.3 FL — HIGH (ref 80–100)
NITRITE UR-MCNC: NEGATIVE — SIGNIFICANT CHANGE UP
NRBC # FLD: 0 K/UL — SIGNIFICANT CHANGE UP (ref 0–0)
PH UR: 6 — SIGNIFICANT CHANGE UP (ref 5–8)
PLATELET # BLD AUTO: 168 K/UL — SIGNIFICANT CHANGE UP (ref 150–400)
PMV BLD: 10.8 FL — SIGNIFICANT CHANGE UP (ref 7–13)
POTASSIUM SERPL-MCNC: 4 MMOL/L — SIGNIFICANT CHANGE UP (ref 3.5–5.3)
POTASSIUM SERPL-SCNC: 4 MMOL/L — SIGNIFICANT CHANGE UP (ref 3.5–5.3)
PROT UR-MCNC: 10 — SIGNIFICANT CHANGE UP
RBC # BLD: 3.84 M/UL — LOW (ref 4.2–5.8)
RBC # FLD: 11.8 % — SIGNIFICANT CHANGE UP (ref 10.3–14.5)
RBC CASTS # UR COMP ASSIST: SIGNIFICANT CHANGE UP (ref 0–?)
SODIUM SERPL-SCNC: 138 MMOL/L — SIGNIFICANT CHANGE UP (ref 135–145)
SP GR SPEC: 1.02 — SIGNIFICANT CHANGE UP (ref 1–1.04)
SQUAMOUS # UR AUTO: SIGNIFICANT CHANGE UP
UROBILINOGEN FLD QL: NORMAL — SIGNIFICANT CHANGE UP
WBC # BLD: 4.45 K/UL — SIGNIFICANT CHANGE UP (ref 3.8–10.5)
WBC # FLD AUTO: 4.45 K/UL — SIGNIFICANT CHANGE UP (ref 3.8–10.5)
WBC UR QL: SIGNIFICANT CHANGE UP (ref 0–?)

## 2020-03-08 PROCEDURE — 99283 EMERGENCY DEPT VISIT LOW MDM: CPT

## 2020-03-08 NOTE — ED PROVIDER NOTE - CARE PLAN
Principal Discharge DX:	Hematuria, unspecified type Mercedes Flap Text: The defect edges were debeveled with a #15 scalpel blade.  Given the location of the defect, shape of the defect and the proximity to free margins a Mercedes flap was deemed most appropriate.  Using a sterile surgical marker, an appropriate advancement flap was drawn incorporating the defect and placing the expected incisions within the relaxed skin tension lines where possible. The area thus outlined was incised deep to adipose tissue with a #15 scalpel blade.  The skin margins were undermined to an appropriate distance in all directions utilizing iris scissors.

## 2020-03-08 NOTE — ED PROVIDER NOTE - NS ED ROS FT
Constitutional: No fever or Chills.  No unexpected weight loss.  Head, Eyes, Ears, Nose, Throat: No visual changes.  No tongue or throat swelling or pain.  Neck: No neck stiffness.  Pulmonary: No shortness of breath or cough.  No wheezing.  Cardiovascular: No chest pain, palpitations, or diaphoresis.  Abdominal: No abdominal pain. No nausea, vomiting, diarrhea.  No bloody or melenic stools.  Genitourinary: No dysuria.  + hematuria.  No discharge.  Endocrine: No frequent urination or unusual thirst.  No hair or skin changes.  Hematologic: No lymph node swelling, easy bruising, or bleeding.  Dermatologic: No rashes.  Allergic: No new exposures, mucosal swelling, or pruritis.  Neurologic: No headache, weakness, visual changes, speech changes, or sensory abnormalities.  No fainting episodes.  No gait imbalance.  Psychiatric: No depression or insomnia.

## 2020-03-08 NOTE — ED PROVIDER NOTE - PHYSICAL EXAMINATION
General: Seated in Stretcher, Awake, Alert, Conversant  Head, Ears, Eyes, Nose, Throat: Pupils equal, round, reactive to light, normal sclera, moist oral mucosa  Pulmonary: Breath sounds bilaterally, no increased work of breathing, speaking full sentences  Cardiovascular: Normal and regular heart rate, normal S1/S2, no murmurs, rubs, or gallops  Abdominal: Soft and nontender, no rebound or guarding  Extremities: No lower extremity edema or erythema, nontender  Dermatologic: No rash  Neurologic: Alert and oriented x3, clear and fluent speech, moving all extremities with good strength

## 2020-03-08 NOTE — ED PROVIDER NOTE - CLINICAL SUMMARY MEDICAL DECISION MAKING FREE TEXT BOX
55 y/o M with PMHx of HTN presents to ED with hematuria. No symptoms concerning for anemia at this time. Low suspicion for infection, but will screen for UTI. Doubt renal colic given lack of pain and emesis. No other evidence of malignancy or rheumatoid etiology in history or exam. Plan for labs, UA, and urine culture. Anticipate discharge for outpatient follow up if lab work is unremarkable. 55 y/o M with PMHx of HTN presents to ED with hematuria. No symptoms concerning for anemia at this time. Low suspicion for infection, but will screen for UTI. Doubt renal colic given lack of pain and emesis. No other evidence of malignancy or rheumatoid etiology in history or exam. Plan for labs, UA, and urine culture: Anticipate discharge for outpatient follow up if lab work is unremarkable.

## 2020-03-08 NOTE — ED PROVIDER NOTE - PATIENT PORTAL LINK FT
You can access the FollowMyHealth Patient Portal offered by [facility] by registering at the following website: [email or web address]. By joining [employer]’s FollowMyHealth portal, you will also be able to view your health information using other applications (apps) compatible with our system.

## 2020-03-08 NOTE — ED PROVIDER NOTE - OBJECTIVE STATEMENT
55 y/o M with PMHx of HTN presents to ED with hematuria. Pt notes red tinge to urine without clots for past 2-3 days. Denies having any fever, chills, dysuria, nausea, vomiting, abdominal pain, back pain, arthralgias, or other medical complaints. Reports prior episode of which he was evaluated involving blood work and CT scan of abdomen/pelvis which patient reports as being within normal limits. Patient passing normal flatus and stool. 53 y/o M with PMHx of HTN presents to ED with hematuria. Pt notes red tinge to urine without clots for past 2-3 days. Denies having any fever, chills, dysuria, nausea, vomiting, abdominal pain, back pain, arthralgias, or other medical complaints. Reports prior episode of which he was evaluated involving blood work and CT scan of abdomen/pelvis, which patient reports as being within normal limits. Patient passing normal flatus and stool.

## 2020-03-08 NOTE — ED ADULT TRIAGE NOTE - CHIEF COMPLAINT QUOTE
Patient noticed dark red blood in urine this morning.  Denies SOB, weakness or dizziness.  No anticoagulant.  No significant history.

## 2020-03-12 RX ORDER — AMLODIPINE BESYLATE 2.5 MG/1
1 TABLET ORAL
Qty: 0 | Refills: 0 | DISCHARGE

## 2021-04-17 ENCOUNTER — TRANSCRIPTION ENCOUNTER (OUTPATIENT)
Age: 56
End: 2021-04-17

## 2021-09-25 ENCOUNTER — TRANSCRIPTION ENCOUNTER (OUTPATIENT)
Age: 56
End: 2021-09-25

## 2022-05-28 NOTE — ED PROVIDER NOTE - NS_ ATTENDINGSCRIBEDETAILS _ED_A_ED_FT
Yes I personally performed the  interview and exam documented above and dictated my findings to the scribe. I subsequently reviewed the chart with corrections and additions made as needed.

## 2023-06-30 PROBLEM — I10 ESSENTIAL (PRIMARY) HYPERTENSION: Chronic | Status: ACTIVE | Noted: 2020-03-12

## 2023-07-03 ENCOUNTER — APPOINTMENT (OUTPATIENT)
Dept: ORTHOPEDIC SURGERY | Facility: CLINIC | Age: 58
End: 2023-07-03
Payer: COMMERCIAL

## 2023-07-03 ENCOUNTER — NON-APPOINTMENT (OUTPATIENT)
Age: 58
End: 2023-07-03

## 2023-07-03 VITALS — BODY MASS INDEX: 33.6 KG/M2 | HEIGHT: 71 IN | WEIGHT: 240 LBS

## 2023-07-03 DIAGNOSIS — M25.569 PAIN IN UNSPECIFIED KNEE: ICD-10-CM

## 2023-07-03 DIAGNOSIS — M17.11 UNILATERAL PRIMARY OSTEOARTHRITIS, RIGHT KNEE: ICD-10-CM

## 2023-07-03 PROCEDURE — 99204 OFFICE O/P NEW MOD 45 MIN: CPT

## 2023-07-03 PROCEDURE — 73564 X-RAY EXAM KNEE 4 OR MORE: CPT | Mod: RT

## 2023-07-03 RX ORDER — HYALURONATE SODIUM 20 MG/2 ML
20 SYRINGE (ML) INTRAARTICULAR
Qty: 1 | Refills: 0 | Status: ACTIVE | COMMUNITY
Start: 2023-07-03

## 2023-07-03 NOTE — PHYSICAL EXAM
[de-identified] : General: No acute distress\par Mental: Alert and oriented x3\par Eyes: Conjunctivitis not seen\par Chest: Symmetric chest rise, no audible wheezing\par Skin: Bilateral lower extremities absent from rashes and ulcers\par Abdomen: No distention\par \par Right knee:\par Skin: Clean, dry, intact \par Inspection: No obvious malalignment, no masses, no swelling, large effusion. \par Tenderness: Positive MJLT. no LJLT. No tenderness over the medial and lateral patella facets. No ttp medial/lateral epicondyle, patella tendon, tibial tubercle, pes anserinus, or proximal fibula. \par ROM: 5 to 85° pain with deep flexion and crepitus\par Stability: Stable to varus and valgus, negative lachman. Negative anterior/posterior drawer. \par Additional tests: Negative McMurrays test, negative Steinmann, Negative patellar grind test.  \par Strength: 5/5 Q/H/TA/GS/EHL, no atrophy \par Neuro: Sensation intact to light touch throughout in dp/sp/tib/mae/saph nerve distributions\par Pulses: 2+ DP/PT pulses.\par  [de-identified] : X-rays of the right knee including 4 views shows varus alignment with severe narrowing of the medial and lateral joint space, bone-on-bone sclerosis with diffuse osteophytes, posterior translation of the tibia.  Patella at appropriate height and central position.  Kellgren-Reji 4

## 2023-07-03 NOTE — DISCUSSION/SUMMARY
[de-identified] : 58-year-old male with chronic right knee pain secondary to advanced degenerative arthritis.\par I discussed the treatment of degenerative arthritis with the patient at length today. I described the spectrum of treatment from nonoperative modalities to total joint arthroplasty. Noninvasive and nonoperative treatment modalities include weight reduction, activity modification with low impact exercise, PRN use of acetaminophen or anti-inflammatory medication if tolerated, glucosamine/chondroitin supplements, and physical therapy. Further treatments can include corticosteroid injection and the use of hyaluronic acid injections. Definitive treatment can certainly include total joint arthroplasty also. \par He will continue with physical therapy and stretching exercises, and I encouraged him to work on flexion of the knee since this will impact postoperative recovery from TKA.  We will attempt authorization for gel injections.  He can follow-up in 2 months if unable to do gel injections.

## 2023-07-03 NOTE — HISTORY OF PRESENT ILLNESS
[de-identified] : 58-year-old male presents with chronic right knee pain for over 10 years, and worsening severe pain over the past 1 year.  No associated injuries.  He experiences sharp pain throughout the knee, with associated swelling, buckling, and grinding. The pain substantially limits activities of daily living. Walking tolerance is reduced. Medication including NSAIDs and activity modification have been minimally effective for a period lasting greater than three months in duration. The patient denies any radiation of the pain to the feet and it is not associated with numbness, tingling, or weakness.  He underwent right knee arthroscopy in 2015.  He started physical therapy over the last 3 weeks, and had a cortisone injection to the right knee on 5/23 at [facility].\par He works as a patient transporter at [employer]

## 2023-08-03 ENCOUNTER — NON-APPOINTMENT (OUTPATIENT)
Age: 58
End: 2023-08-03

## 2023-08-21 ENCOUNTER — APPOINTMENT (OUTPATIENT)
Dept: ORTHOPEDIC SURGERY | Facility: CLINIC | Age: 58
End: 2023-08-21

## 2023-08-25 NOTE — ED PROVIDER NOTE - NS ED MD DISPO SPECIAL CONSIDERATION1
[address] 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 He has had some chronic bilateral lower extremity weakness as well as some upper extremity weakness right in the upper extremity. I suspect his general weakness is related to his multi organ involvement with elevated liver function studies, acute renal failure, hyperglycemia, pneumonia versus pulmonary edema, suspected PE and anemia. Plan and Recommendations: Continue management of his significant multiorgan problems as outlined above. If improvement in these organ systems is not associated with an improvement in his general overall condition and weakness, reconsult neurology next week. Barrier to Discharge from Neurologic Perspective: None. Outpatient Neurology Follow Up: To be determined. Yaakov Abraham M.D. Diplomate, American Board of Neurology and Psychiatry  Diplomate, American Board of Electrodiagnostic Medicine  Subspecialty Certification, Headache Medicine, [facility]  ______________________________________________________________________    Reason for Consultation: Kelly Lee is a 44 y.o. y/o male who we are asked to see in consultation for history of demyelinating disease of central nervous system and generalized weakness. History of Present Illness: This gentleman's had progressive generalized weakness over the last 2 to 3 days.   He was admitted and currently diagnosed with a None

## 2024-05-29 ENCOUNTER — OUTPATIENT (OUTPATIENT)
Dept: OUTPATIENT SERVICES | Facility: HOSPITAL | Age: 59
LOS: 1 days | Discharge: HOME | End: 2024-05-29

## 2024-05-29 DIAGNOSIS — D64.9 ANEMIA, UNSPECIFIED: ICD-10-CM

## 2024-06-03 ENCOUNTER — NON-APPOINTMENT (OUTPATIENT)
Age: 59
End: 2024-06-03

## 2024-06-04 ENCOUNTER — RESULT REVIEW (OUTPATIENT)
Age: 59
End: 2024-06-04

## 2024-06-04 ENCOUNTER — APPOINTMENT (OUTPATIENT)
Dept: HEMATOLOGY ONCOLOGY | Facility: CLINIC | Age: 59
End: 2024-06-04
Payer: COMMERCIAL

## 2024-06-04 VITALS
DIASTOLIC BLOOD PRESSURE: 78 MMHG | BODY MASS INDEX: 33.39 KG/M2 | TEMPERATURE: 99.1 F | HEART RATE: 63 BPM | SYSTOLIC BLOOD PRESSURE: 135 MMHG | RESPIRATION RATE: 16 BRPM | OXYGEN SATURATION: 97 % | WEIGHT: 228 LBS | HEIGHT: 69.29 IN

## 2024-06-04 DIAGNOSIS — D64.9 ANEMIA, UNSPECIFIED: ICD-10-CM

## 2024-06-04 LAB
BASOPHILS # BLD AUTO: 0.02 K/UL — SIGNIFICANT CHANGE UP (ref 0–0.2)
BASOPHILS NFR BLD AUTO: 0.4 % — SIGNIFICANT CHANGE UP (ref 0–2)
EOSINOPHIL # BLD AUTO: 0.34 K/UL — SIGNIFICANT CHANGE UP (ref 0–0.5)
EOSINOPHIL NFR BLD AUTO: 7.2 % — HIGH (ref 0–6)
HCT VFR BLD CALC: 36.1 % — LOW (ref 39–50)
HGB BLD-MCNC: 11.9 G/DL — LOW (ref 13–17)
IMM GRANULOCYTES NFR BLD AUTO: 0.2 % — SIGNIFICANT CHANGE UP (ref 0–0.9)
LYMPHOCYTES # BLD AUTO: 0.77 K/UL — LOW (ref 1–3.3)
LYMPHOCYTES # BLD AUTO: 16.3 % — SIGNIFICANT CHANGE UP (ref 13–44)
MCHC RBC-ENTMCNC: 32.9 PG — SIGNIFICANT CHANGE UP (ref 27–34)
MCHC RBC-ENTMCNC: 33 G/DL — SIGNIFICANT CHANGE UP (ref 32–36)
MCV RBC AUTO: 99.7 FL — SIGNIFICANT CHANGE UP (ref 80–100)
MONOCYTES # BLD AUTO: 0.32 K/UL — SIGNIFICANT CHANGE UP (ref 0–0.9)
MONOCYTES NFR BLD AUTO: 6.8 % — SIGNIFICANT CHANGE UP (ref 2–14)
NEUTROPHILS # BLD AUTO: 3.26 K/UL — SIGNIFICANT CHANGE UP (ref 1.8–7.4)
NEUTROPHILS NFR BLD AUTO: 69.1 % — SIGNIFICANT CHANGE UP (ref 43–77)
NRBC # BLD: 0 /100 WBCS — SIGNIFICANT CHANGE UP (ref 0–0)
PLATELET # BLD AUTO: 153 K/UL — SIGNIFICANT CHANGE UP (ref 150–400)
RBC # BLD: 3.62 M/UL — LOW (ref 4.2–5.8)
RBC # FLD: 11.8 % — SIGNIFICANT CHANGE UP (ref 10.3–14.5)
WBC # BLD: 4.72 K/UL — SIGNIFICANT CHANGE UP (ref 3.8–10.5)
WBC # FLD AUTO: 4.72 K/UL — SIGNIFICANT CHANGE UP (ref 3.8–10.5)

## 2024-06-04 PROCEDURE — 99205 OFFICE O/P NEW HI 60 MIN: CPT

## 2024-06-05 LAB
ALBUMIN SERPL ELPH-MCNC: 4.3 G/DL
ALP BLD-CCNC: 81 U/L
ALT SERPL-CCNC: 18 U/L
ANION GAP SERPL CALC-SCNC: 11 MMOL/L
AST SERPL-CCNC: 24 U/L
BILIRUB SERPL-MCNC: 1 MG/DL
BUN SERPL-MCNC: 10 MG/DL
CALCIUM SERPL-MCNC: 9.1 MG/DL
CHLORIDE SERPL-SCNC: 107 MMOL/L
CO2 SERPL-SCNC: 22 MMOL/L
CREAT SERPL-MCNC: 1.14 MG/DL
CRP SERPL-MCNC: <3 MG/L
EGFR: 73 ML/MIN/1.73M2
FERRITIN SERPL-MCNC: 171 NG/ML
FOLATE SERPL-MCNC: >20 NG/ML
GLUCOSE SERPL-MCNC: 93 MG/DL
IRON SATN MFR SERPL: 18 %
IRON SERPL-MCNC: 52 UG/DL
POTASSIUM SERPL-SCNC: 3.8 MMOL/L
PROT SERPL-MCNC: 7.3 G/DL
SODIUM SERPL-SCNC: 140 MMOL/L
TIBC SERPL-MCNC: 293 UG/DL
TSH SERPL-ACNC: 1.68 UIU/ML
UIBC SERPL-MCNC: 241 UG/DL
VIT B12 SERPL-MCNC: 451 PG/ML

## 2024-06-06 LAB
ALBUMIN MFR SERPL ELPH: 57.4 %
ALBUMIN SERPL-MCNC: 4.2 G/DL
ALBUMIN/GLOB SERPL: 1.4 RATIO
ALPHA1 GLOB MFR SERPL ELPH: 3.2 %
ALPHA1 GLOB SERPL ELPH-MCNC: 0.2 G/DL
ALPHA2 GLOB MFR SERPL ELPH: 7.5 %
ALPHA2 GLOB SERPL ELPH-MCNC: 0.5 G/DL
B-GLOBULIN MFR SERPL ELPH: 13.7 %
B-GLOBULIN SERPL ELPH-MCNC: 1 G/DL
DEPRECATED KAPPA LC FREE/LAMBDA SER: 0.94 RATIO
EPO SERPL-MCNC: 11.6 MIU/ML
ERYTHROCYTE [SEDIMENTATION RATE] IN BLOOD BY WESTERGREN METHOD: 8 MM/HR
GAMMA GLOB FLD ELPH-MCNC: 1.3 G/DL
GAMMA GLOB MFR SERPL ELPH: 18.2 %
HGB A MFR BLD: 97.4 %
HGB A2 MFR BLD: 2.6 %
HGB FRACT BLD-IMP: NORMAL
IGA SER QL IEP: 480 MG/DL
IGG SER QL IEP: 1277 MG/DL
IGM SER QL IEP: 78 MG/DL
INTERPRETATION SERPL IEP-IMP: NORMAL
KAPPA LC CSF-MCNC: 2.69 MG/DL
KAPPA LC SERPL-MCNC: 2.54 MG/DL
M PROTEIN SPEC IFE-MCNC: NORMAL
PROT SERPL-MCNC: 7.3 G/DL
PROT SERPL-MCNC: 7.3 G/DL

## 2024-06-12 PROBLEM — D64.9 ANEMIA, UNSPECIFIED TYPE: Status: ACTIVE | Noted: 2024-05-30

## 2024-06-12 LAB
METHYLMALONATE SERPL-SCNC: 110 NMOL/L
ONKOSIGHT MYELOID SEQ.: NORMAL

## 2024-06-12 NOTE — CONSULT LETTER
[Dear  ___] : Dear  [unfilled], [Consult Letter:] : I had the pleasure of evaluating your patient, [unfilled]. [( Thank you for referring [unfilled] for consultation for _____ )] : Thank you for referring [unfilled] for consultation for [unfilled] [Please see my note below.] : Please see my note below. [Consult Closing:] : Thank you very much for allowing me to participate in the care of this patient.  If you have any questions, please do not hesitate to contact me. [Sincerely,] : Sincerely, [FreeTextEntry3] : Bradley Goldberg M.D.  Hematology/Oncology [facility] [phone]

## 2024-06-12 NOTE — HISTORY OF PRESENT ILLNESS
[de-identified] : Mr. Zepeda is a 58 y/o man with a history of HSV, HTN, HLD who presents for normocytic anemia. He reports going to his PCP for the past 8-9 years, he has been told that his hemoglobin has been low for the past 3-4 years. For the past three to four years the patient is frequently fatigued, on break time he is usually sleeping and power napping. No PICA symptoms. Denies any overt bleeding, no dark colored stools. Has some paresthesias in both of his feet, which started about a year ago. The numbness is there persistently, denies any weakness. Patient works out daily with weight lifting and does not feel short of breath. 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 About 9-10 months patient went dermatologist for a lesion on the right bridge of nose and near left nasal border. He had the left nasal border biopsied and was told that he had a diagnosis of sarcoidosis. He subsequently went to a pulmonologist and underwent PFTs and was told that those were normal and didn't have sarcoidosis. Patient underwent a colonoscopy about 4 years ago and was told that his scope was clean and would need a repeat in 10 years. Denies any chest pain or palpitations. No easy bruising or bleeding. No changes in dietary habits. No weight loss or weight gain. No night sweats, chills, or fevers. No early satiety. 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 Denies any cough, sore throat.   Of note patient states that about 30 years ago he was in [facility], admitted for unclear reason but when his blood work was done, he was found to have thrombocytopenia and underwent a bone marrow biopsy and was told that the bone marrow biopsy at the time was normal.     PMHx: As above  PSHx: Arthroscopic Knee Surgery (2017/2018) Meds: Valtrex (PRN for outbreaks), Amlodipine,  Social: Works as transport for [employer]; Never tobacco user, Social EtOH, no illicit drug. Lives with wife in [place]. Patient is a [facility] .  PCP: Dr. Nayana Steel [phone]; Dermatologist: [facility] in [place] [phone]; Patient not sure who his GI and pulmonologist is.  Family History: No family heme disorders or malignancy. No hx of DVT or PE in the past.

## 2024-06-12 NOTE — REVIEW OF SYSTEMS
[Fatigue] : fatigue [SOB on Exertion] : shortness of breath during exertion [Incontinence] : incontinence [Negative] : Heme/Lymph [Wheezing] : no wheezing [Cough] : no cough [de-identified] : + feet paresthesias

## 2024-06-12 NOTE — ASSESSMENT
[FreeTextEntry1] : 60 y/o man w. hx of HSV HTN, HLD who presents for normocytic anemia. Endorses that he has had on and off labs that have shown normocytic anemia but has never been formally worked up. He reports having a possible skin biopsy with dermatology that showed evidence of possible sarcoidosis, these results he will be faxing over to our office. His labs however do suggest anemia of chronic illness with iron studies noted to have T iron levels of 66 ug/dL, ferritin 192. Other blood work suggesting no evidence of overt vitamin deficiencies from outpatient labs. Of note he did have a bout of thrombocytopenia >30 years ago that required a BMBx at [facility] which he reported was normal. Since then his thrombocytopenia has resolved.   Symptomatically he reports that he is overall feeling well. He does endorse heavy breathing with minimal exertion that is notable to his co-workers. He reports feeling fatigued but also notes that he works ~3 jobs with many nights where he gets minimal sleep. Otherwise he carries a PS of 0-1 and reports being fairly fit for his age.     Plan: -Outpatient labs reviewed on 3/15/24 w/ WBC count of 3.96, Hgb of 12.3 and platelet count of 152K. Iron studies noted to have T iron levels of 66 ug/dL, ferritin 192 B12 levels ~400 will check MMA and homocysteine levels along with folate levels.  -Labs on 6/4/24 noted to be WBC of 4.72, Hgb of 11.9 g/dL, and Plt 153 K/uL.  -Will check PB NGS panel to r/o CHIP.  -F/u SPEP, retic, iron studies, B12 and folate, and rest of anemia w/u drawn on 6/4/24.  -Patient to fax over reports of his derm biopsy, PFTs, and colonoscopy.  -RTO in 3 months   Plan d/w Dr. Goldberg and patient in the office.  Adam Andres MD. PGY-IV Hematology Oncology Fellow

## 2024-08-26 ENCOUNTER — OUTPATIENT (OUTPATIENT)
Dept: OUTPATIENT SERVICES | Facility: HOSPITAL | Age: 59
LOS: 1 days | Discharge: HOME | End: 2024-08-26

## 2024-08-26 DIAGNOSIS — D64.9 ANEMIA, UNSPECIFIED: ICD-10-CM

## 2024-08-30 DIAGNOSIS — D64.9 ANEMIA, UNSPECIFIED: ICD-10-CM

## 2024-09-05 ENCOUNTER — APPOINTMENT (OUTPATIENT)
Dept: HEMATOLOGY ONCOLOGY | Facility: CLINIC | Age: 59
End: 2024-09-05

## 2024-09-20 ENCOUNTER — APPOINTMENT (OUTPATIENT)
Dept: ORTHOPEDIC SURGERY | Facility: CLINIC | Age: 59
End: 2024-09-20
Payer: COMMERCIAL

## 2024-09-20 VITALS — WEIGHT: 230 LBS | BODY MASS INDEX: 32.2 KG/M2 | HEIGHT: 71 IN

## 2024-09-20 DIAGNOSIS — M17.11 UNILATERAL PRIMARY OSTEOARTHRITIS, RIGHT KNEE: ICD-10-CM

## 2024-09-20 PROCEDURE — 99204 OFFICE O/P NEW MOD 45 MIN: CPT | Mod: 25

## 2024-09-20 PROCEDURE — 20610 DRAIN/INJ JOINT/BURSA W/O US: CPT | Mod: RT

## 2024-09-20 RX ORDER — HYALURONATE SODIUM 20 MG/2 ML
20 SYRINGE (ML) INTRAARTICULAR
Qty: 1 | Refills: 0 | Status: ACTIVE | COMMUNITY
Start: 2024-09-20

## 2024-09-20 NOTE — HISTORY OF PRESENT ILLNESS
[de-identified] : 58yo male presents complaining of right knee pain for several years.  Reports pain and stiffness in his knee.  Known history of osteoarthritis.  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  Pain and swelling with extended periods of walking.  He has not had any injections.  He states he is active enjoys going to the gym and exercising.  No injuries.  Denies numbness tingling  The patient's past medical history, past surgical history, medications, allergies, and social history were reviewed by me today with the patient and documented accordingly. In addition, the patient's family history, which is noncontributory to this visit, was also reviewed.

## 2024-09-20 NOTE — PHYSICAL EXAM
[de-identified] : General Exam  Well developed, well nourished  No apparent distress  Oriented to person, place, and time  Mood: Normal  Affect: Normal  Balance and coordination: Normal  Gait: Normal  right knee exam    Skin: Clean, dry, intact Inspection: No obvious malalignment, no masses, no swelling, no effusion.  Tenderness: + MJLT. No LJLT. No tenderness over the medial and lateral patella facets. No ttp medial/lateral epicondyle, patella tendon, tibial tubercle, pes anserinus, or proximal fibula.  ROM: 0 to 130  +pain with deep flexion  Stability: Stable to varus, valgus, lachman testing. Negative anterior/posterior drawer.  Additional tests: Negative McMurrays test, Negative patellar grind test.   Strength: 5/5 Q/H/TA/GS/EHL, no atrophy  Neuro: In tact to light touch throughout in dp/sp/tib/mae/saph nerve districutions,  DTR's normal Pulses: 2+ DP/PT pulses.  [de-identified] : Radiographs obtained outside reviewed severe osteoarthritis loss of joint space osteophyte sclerosis

## 2024-09-20 NOTE — DISCUSSION/SUMMARY
[de-identified] : Right knee osteoarthritis.  I discussed the treatment of degenerative arthritis with the patient at length today. I described the spectrum of treatment from nonoperative modalities to total joint arthroplasty. Noninvasive and nonoperative treatment modalities include weight reduction, activity modification with low impact exercise, PRN use of acetaminophen or anti-inflammatory medication if tolerated, glucosamine/chondroitin supplements, and physical therapy. Further treatments can include corticosteroid injection and the use of hyaluronic acid injections. Definitive treatment can certainly include total joint arthroplasty also. The risks and benefits of each treatment options was discussed and all questions were answered. Injection: Right knee joint. Indication: Arthritis.  A discussion was had with the patient regarding this procedure and all questions were answered. All risks, benefits and alternatives were discussed. These included but were not limited to bleeding, infection, and allergic reaction. Alcohol was used to clean the skin, and betadine was used to sterilize and prep the area in the supero-lateral aspect of the right knee. Ethyl chloride spray was then used as a topical anesthetic. A 21-gauge needle was used to inject 4cc of 1% lidocaine and 1cc of 40mg/ml methylprednisolone into the knee. A sterile bandage was then applied. The patient tolerated the procedure well and there were no complications.   Ice.  Tylenol.  Follow-up as needed

## 2024-10-10 ENCOUNTER — APPOINTMENT (OUTPATIENT)
Dept: ORTHOPEDIC SURGERY | Facility: CLINIC | Age: 59
End: 2024-10-10
Payer: COMMERCIAL

## 2024-10-10 PROCEDURE — 20610 DRAIN/INJ JOINT/BURSA W/O US: CPT | Mod: RT

## 2024-10-17 ENCOUNTER — APPOINTMENT (OUTPATIENT)
Dept: ORTHOPEDIC SURGERY | Facility: CLINIC | Age: 59
End: 2024-10-17
Payer: COMMERCIAL

## 2024-10-17 DIAGNOSIS — M17.11 UNILATERAL PRIMARY OSTEOARTHRITIS, RIGHT KNEE: ICD-10-CM

## 2024-10-17 PROCEDURE — 20610 DRAIN/INJ JOINT/BURSA W/O US: CPT | Mod: RT

## 2024-10-29 ENCOUNTER — APPOINTMENT (OUTPATIENT)
Dept: ORTHOPEDIC SURGERY | Facility: CLINIC | Age: 59
End: 2024-10-29
Payer: COMMERCIAL

## 2024-10-29 DIAGNOSIS — M17.11 UNILATERAL PRIMARY OSTEOARTHRITIS, RIGHT KNEE: ICD-10-CM

## 2024-10-29 PROCEDURE — 20610 DRAIN/INJ JOINT/BURSA W/O US: CPT | Mod: RT

## 2025-05-13 ENCOUNTER — NON-APPOINTMENT (OUTPATIENT)
Age: 60
End: 2025-05-13

## 2025-05-21 ENCOUNTER — APPOINTMENT (OUTPATIENT)
Dept: ORTHOPEDIC SURGERY | Facility: CLINIC | Age: 60
End: 2025-05-21
Payer: COMMERCIAL

## 2025-05-21 ENCOUNTER — NON-APPOINTMENT (OUTPATIENT)
Age: 60
End: 2025-05-21

## 2025-05-21 VITALS — HEIGHT: 71 IN | BODY MASS INDEX: 34.58 KG/M2 | WEIGHT: 247 LBS

## 2025-05-21 DIAGNOSIS — M17.11 UNILATERAL PRIMARY OSTEOARTHRITIS, RIGHT KNEE: ICD-10-CM

## 2025-05-21 PROCEDURE — 99213 OFFICE O/P EST LOW 20 MIN: CPT

## 2025-05-21 RX ORDER — HYALURONATE SODIUM 20 MG/2 ML
20 SYRINGE (ML) INTRAARTICULAR
Qty: 1 | Refills: 0 | Status: ACTIVE | COMMUNITY
Start: 2025-05-21

## 2025-06-04 ENCOUNTER — APPOINTMENT (OUTPATIENT)
Dept: ORTHOPEDIC SURGERY | Facility: CLINIC | Age: 60
End: 2025-06-04
Payer: COMMERCIAL

## 2025-06-04 PROCEDURE — 20610 DRAIN/INJ JOINT/BURSA W/O US: CPT | Mod: RT

## 2025-06-12 ENCOUNTER — APPOINTMENT (OUTPATIENT)
Dept: ORTHOPEDIC SURGERY | Facility: CLINIC | Age: 60
End: 2025-06-12
Payer: COMMERCIAL

## 2025-06-12 PROCEDURE — 20610 DRAIN/INJ JOINT/BURSA W/O US: CPT | Mod: RT

## 2025-06-19 ENCOUNTER — APPOINTMENT (OUTPATIENT)
Dept: ORTHOPEDIC SURGERY | Facility: CLINIC | Age: 60
End: 2025-06-19
Payer: COMMERCIAL

## 2025-06-19 PROCEDURE — 20610 DRAIN/INJ JOINT/BURSA W/O US: CPT | Mod: RT
